# Patient Record
Sex: FEMALE | Race: BLACK OR AFRICAN AMERICAN | Employment: FULL TIME | URBAN - METROPOLITAN AREA
[De-identification: names, ages, dates, MRNs, and addresses within clinical notes are randomized per-mention and may not be internally consistent; named-entity substitution may affect disease eponyms.]

---

## 2017-01-23 ENCOUNTER — OFFICE VISIT (OUTPATIENT)
Dept: FAMILY MEDICINE CLINIC | Age: 32
End: 2017-01-23

## 2017-01-23 VITALS
BODY MASS INDEX: 15.89 KG/M2 | DIASTOLIC BLOOD PRESSURE: 68 MMHG | WEIGHT: 111 LBS | OXYGEN SATURATION: 98 % | RESPIRATION RATE: 18 BRPM | TEMPERATURE: 98 F | HEART RATE: 86 BPM | HEIGHT: 70 IN | SYSTOLIC BLOOD PRESSURE: 118 MMHG

## 2017-01-23 DIAGNOSIS — R53.83 OTHER FATIGUE: ICD-10-CM

## 2017-01-23 DIAGNOSIS — E03.9 ACQUIRED HYPOTHYROIDISM: Primary | ICD-10-CM

## 2017-01-23 DIAGNOSIS — N39.0 URINARY TRACT INFECTION, SITE UNSPECIFIED: ICD-10-CM

## 2017-01-23 DIAGNOSIS — R10.9 ABDOMINAL CRAMPING: ICD-10-CM

## 2017-01-23 LAB
BILIRUB UR QL STRIP: NEGATIVE
GLUCOSE UR-MCNC: NEGATIVE MG/DL
HCG URINE, QL. (POC): NEGATIVE
KETONES P FAST UR STRIP-MCNC: NEGATIVE MG/DL
PH UR STRIP: 8.5 [PH] (ref 4.6–8)
PROT UR QL STRIP: NEGATIVE MG/DL
SP GR UR STRIP: 1.01 (ref 1–1.03)
UA UROBILINOGEN AMB POC: ABNORMAL (ref 0.2–1)
URINALYSIS CLARITY POC: CLEAR
URINALYSIS COLOR POC: YELLOW
URINE BLOOD POC: NEGATIVE
URINE LEUKOCYTES POC: ABNORMAL
URINE NITRITES POC: NEGATIVE
VALID INTERNAL CONTROL?: YES

## 2017-01-23 RX ORDER — LEVOTHYROXINE SODIUM 137 UG/1
137 TABLET ORAL
Qty: 30 TAB | Refills: 1 | Status: SHIPPED | OUTPATIENT
Start: 2017-01-23 | End: 2017-03-03 | Stop reason: SDUPTHER

## 2017-01-23 RX ORDER — CIPROFLOXACIN 250 MG/1
250 TABLET, FILM COATED ORAL EVERY 12 HOURS
Qty: 10 TAB | Refills: 0 | Status: SHIPPED | OUTPATIENT
Start: 2017-01-23 | End: 2017-01-28

## 2017-01-23 NOTE — PROGRESS NOTES
HISTORY OF PRESENT ILLNESS  Reyes Staff is a 28 y.o. female. HPI: Patient is here to check her TSH, currently she is not taking any synthroid, she ran out of her pills. She C/O fatigue since she ran out of her medication. She reports she had milder and shorter menstrual period this month. She also C/O abdominal cramping and urinary frequency. She is sexually active and denies abnormal vaginal discharge. Past Medical History   Diagnosis Date    Arthritis     Scoliosis     Sickle cell trait ([address]     Thyroid disease      Past Surgical History   Procedure Laterality Date    Hx gyn      Hx hernia repair  5/85/10     Open umbilical hernia repair     Allergies   Allergen Reactions    Egg Nausea and Vomiting    Flexeril [Cyclobenzaprine] Nausea and Vomiting     Current Outpatient Prescriptions:     levothyroxine (SYNTHROID) 137 mcg tablet, Take 137 mcg by mouth Daily (before breakfast). , Disp: 30 Tab, Rfl: 1    ciprofloxacin HCl (CIPRO) 250 mg tablet, Take 1 Tab by mouth every twelve (12) hours for 5 days. , Disp: 10 Tab, Rfl: 0    ondansetron (ZOFRAN ODT) 4 mg disintegrating tablet, Take 1 Tab by mouth every eight (8) hours as needed for Nausea., Disp: 20 Tab, Rfl: 1    PRENATAL VIT/IRON FUMARATE/FA (PRENATAL PO), Take  by mouth., Disp: , Rfl:     ASPIRIN/ACETAMINOPHEN/CAFFEINE (MIGRAINE PO), Take  by mouth., Disp: , Rfl:     ERGOCALCIFEROL, VITAMIN D2, (VITAMIN D2 PO), Take  by mouth., Disp: , Rfl:     OXYCODONE HCL/ACETAMINOPHEN (PERCOCET PO), Take  by mouth., Disp: , Rfl:   Review of Systems   Constitutional: Positive for malaise/fatigue. HENT: Negative. Respiratory: Negative. Cardiovascular: Negative. Gastrointestinal: Negative. Genitourinary: Positive for frequency and urgency. Blood pressure 118/68, pulse 86, temperature 98 °F (36.7 °C), temperature source Oral, resp.  rate 18, height 5' 10\" (1.778 m), weight 111 lb (50.3 kg), last menstrual period 01/13/2017, SpO2 98 %.    Physical Exam   Constitutional: No distress. HENT:   Mouth/Throat: Oropharynx is clear and moist.   Neck: Neck supple. Cardiovascular: Normal rate and regular rhythm. No murmur heard. Pulmonary/Chest: Effort normal and breath sounds normal.   Abdominal: Soft. Bowel sounds are normal.   Genitourinary: No vaginal discharge found. Genitourinary Comments: UA is positive for infection   Nursing note and vitals reviewed. ASSESSMENT and PLAN    ICD-10-CM ICD-9-CM    1. Acquired hypothyroidism E03.9 244.9 TSH 3RD GENERATION      T4, FREE   2. Abdominal cramping R10.9 789.00 AMB POC URINALYSIS DIP STICK AUTO W/ MICRO      AMB POC URINE PREGNANCY TEST, VISUAL COLOR COMPARISON   3. Other fatigue R53.83 780.79 CBC WITH AUTOMATED DIFF      levothyroxine (SYNTHROID) 137 mcg tablet   4.  Urinary tract infection, site unspecified N39.0  ciprofloxacin HCl (CIPRO) 250 mg tablet   await labs,   Advised to follow up in 6 weeks to recheck her TSH  Pt was given an after visit summary which includes diagnosis, current medicines and vital and voiced understanding of treatment plan

## 2017-01-23 NOTE — PROGRESS NOTES
1. Have you been to the ER, urgent care clinic since your last visit? Hospitalized since your last visit? No    2. Have you seen or consulted any other health care providers outside of the [address] [place] [address] since your last visit? Include any pap smears or colon screening.  No     Chief Complaint   Patient presents with    Thyroid Problem     pt here for thyroid check    Urinary Frequency     pt c/o increased urinary frequency and cramping (abdominal)    Fatigue     pt c/o increased fatigue     Learning Assessment 1/13/2015   PRIMARY LEARNER Patient   PRIMARY LANGUAGE ENGLISH   LEARNER PREFERENCE PRIMARY LISTENING   ANSWERED BY self   RELATIONSHIP SELF   '

## 2017-01-23 NOTE — MR AVS SNAPSHOT
Visit Information Date & Time Provider Department Dept. Phone Encounter #  
 1/23/2017  3:00 PM Sheldon Akers, [address] [phone] [id number] Upcoming Health Maintenance Date Due DTaP/Tdap/Td series (1 - Tdap) 1/5/2006 PAP AKA CERVICAL CYTOLOGY 9/10/2015 Allergies as of 1/23/2017  Review Complete On: 1/23/2017 By: Sheldon Akers NP Severity Noted Reaction Type Reactions Egg  12/08/2015    Nausea and Vomiting Flexeril [Cyclobenzaprine]  01/13/2015    Nausea and Vomiting Current Immunizations  Never Reviewed No immunizations on file. Not reviewed this visit You Were Diagnosed With   
  
 Codes Comments Abdominal cramping    -  Primary ICD-10-CM: R10.9 ICD-9-CM: 789.00 Acquired hypothyroidism     ICD-10-CM: E03.9 ICD-9-CM: 244.9 Other fatigue     ICD-10-CM: R53.83 ICD-9-CM: 780.79 Urinary tract infection, site unspecified     ICD-10-CM: N39.0 Vitals BP Pulse Temp Resp Height(growth percentile) Weight(growth percentile)  
 118/68 (BP 1 Location: Left arm, BP Patient Position: Sitting) 86 98 °F (36.7 °C) (Oral) 18 5' 10\" (1.778 m) 111 lb (50.3 kg) LMP SpO2 BMI OB Status Smoking Status 01/13/2017 (Exact Date) 98% 15.93 kg/m2 Having regular periods Never Smoker Vitals History BMI and BSA Data Body Mass Index Body Surface Area 15.93 kg/m 2 1.58 m 2 Preferred Pharmacy Pharmacy Name Phone [facility] [address] [place] [address] Your Updated Medication List  
  
   
This list is accurate as of: 1/23/17  3:05 PM.  Always use your most recent med list.  
  
  
  
  
 ciprofloxacin HCl 250 mg tablet Commonly known as:  CIPRO Take 1 Tab by mouth every twelve (12) hours for 5 days. levothyroxine 137 mcg tablet Commonly known as:  SYNTHROID Take 137 mcg by mouth Daily (before breakfast).   
  
 MIGRAINE PO  
 Take  by mouth. ondansetron 4 mg disintegrating tablet Commonly known as:  ZOFRAN ODT Take 1 Tab by mouth every eight (8) hours as needed for Nausea. PERCOCET PO Take  by mouth. PRENATAL PO Take  by mouth. VITAMIN D2 PO Take  by mouth. Prescriptions Sent to Pharmacy Refills  
 levothyroxine (SYNTHROID) 137 mcg tablet 1 Sig: Take 137 mcg by mouth Daily (before breakfast). Class: Normal  
 Pharmacy: [address] [place] [address],[address] [place] [address], [address] #: [phone] Route: Oral  
 ciprofloxacin HCl (CIPRO) 250 mg tablet 0 Sig: Take 1 Tab by mouth every twelve (12) hours for 5 days. Class: Normal  
 Pharmacy: [address] [place] [address], [address] #: [phone] Route: Oral  
  
We Performed the Following AMB POC URINALYSIS DIP STICK AUTO W/ MICRO [25207 CPT(R)] AMB POC URINE PREGNANCY TEST, VISUAL COLOR COMPARISON [85472 CPT(R)] CBC WITH AUTOMATED DIFF [65123 CPT(R)] T4, FREE [id number] CPT(R)] TSH 3RD GENERATION [00176 CPT(R)] Introducing [facility] & HEALTH SERVICES! [facility] introduces Roc2Loc patient portal. Now you can access parts of your medical record, email your doctor's office, and request medication refills online. 1. In your internet browser, go to [email or web address] 2. Click on the First Time User? Click Here link in the Sign In box. You will see the New Member Sign Up page. 3. Enter your Roc2Loc Access Code exactly as it appears below. You will not need to use this code after youve completed the sign-up process. If you do not sign up before the expiration date, you must request a new code. · Roc2Loc Access Code: [email or web address] Expires: 1/24/2017  9:02 AM 
 
4. Enter the last four digits of your Social Security Number (xxxx) and Date of Birth (mm/dd/yyyy) as indicated and click Submit. You will be taken to the next sign-up page. 5. Create a LaunchHear ID. This will be your LaunchHear login ID and cannot be changed, so think of one that is secure and easy to remember. 6. Create a LaunchHear password. You can change your password at any time. 7. Enter your Password Reset Question and Answer. This can be used at a later time if you forget your password. 8. Enter your e-mail address. You will receive e-mail notification when new information is available in [address] 9. Click Sign Up. You can now view and download portions of your medical record. 10. Click the Download Summary menu link to download a portable copy of your medical information. If you have questions, please visit the Frequently Asked Questions section of the LaunchHear website. Remember, LaunchHear is NOT to be used for urgent needs. For medical emergencies, dial 911. Now available from your iPhone and Android! Please provide this summary of care documentation to your next provider. Your primary care clinician is listed as Deepthi COOK. If you have any questions after today's visit, please call [phone].

## 2017-01-24 ENCOUNTER — TELEPHONE (OUTPATIENT)
Dept: FAMILY MEDICINE CLINIC | Age: 32
End: 2017-01-24

## 2017-01-24 PROBLEM — D64.9 ANEMIA: Status: ACTIVE | Noted: 2017-01-24

## 2017-01-24 LAB
BASOPHILS # BLD AUTO: 0.1 X10E3/UL (ref 0–0.2)
BASOPHILS NFR BLD AUTO: 1 %
EOSINOPHIL # BLD AUTO: 0.3 X10E3/UL (ref 0–0.4)
EOSINOPHIL NFR BLD AUTO: 5 %
ERYTHROCYTE [DISTWIDTH] IN BLOOD BY AUTOMATED COUNT: 17.7 % (ref 12.3–15.4)
HCT VFR BLD AUTO: 25.8 % (ref 34–46.6)
HGB BLD-MCNC: 7.6 G/DL (ref 11.1–15.9)
IMM GRANULOCYTES # BLD: 0 X10E3/UL (ref 0–0.1)
IMM GRANULOCYTES NFR BLD: 0 %
LYMPHOCYTES # BLD AUTO: 1.7 X10E3/UL (ref 0.7–3.1)
LYMPHOCYTES NFR BLD AUTO: 26 %
MCH RBC QN AUTO: 19.2 PG (ref 26.6–33)
MCHC RBC AUTO-ENTMCNC: 29.5 G/DL (ref 31.5–35.7)
MCV RBC AUTO: 65 FL (ref 79–97)
MONOCYTES # BLD AUTO: 0.8 X10E3/UL (ref 0.1–0.9)
MONOCYTES NFR BLD AUTO: 11 %
NEUTROPHILS # BLD AUTO: 3.9 X10E3/UL (ref 1.4–7)
NEUTROPHILS NFR BLD AUTO: 57 %
PLATELET # BLD AUTO: 550 X10E3/UL (ref 150–379)
RBC # BLD AUTO: 3.95 X10E6/UL (ref 3.77–5.28)
T4 FREE SERPL-MCNC: 0.48 NG/DL (ref 0.82–1.77)
TSH SERPL DL<=0.005 MIU/L-ACNC: 29.53 UIU/ML (ref 0.45–4.5)
WBC # BLD AUTO: 6.8 X10E3/UL (ref 3.4–10.8)

## 2017-01-24 RX ORDER — LANOLIN ALCOHOL/MO/W.PET/CERES
CREAM (GRAM) TOPICAL
Qty: 30 TAB | Refills: 3 | Status: SHIPPED | OUTPATIENT
Start: 2017-01-24

## 2017-03-03 ENCOUNTER — OFFICE VISIT (OUTPATIENT)
Dept: FAMILY MEDICINE CLINIC | Age: 32
End: 2017-03-03

## 2017-03-03 VITALS
HEART RATE: 89 BPM | OXYGEN SATURATION: 100 % | DIASTOLIC BLOOD PRESSURE: 71 MMHG | TEMPERATURE: 98.8 F | WEIGHT: 113.2 LBS | BODY MASS INDEX: 16.21 KG/M2 | SYSTOLIC BLOOD PRESSURE: 109 MMHG | RESPIRATION RATE: 18 BRPM | HEIGHT: 70 IN

## 2017-03-03 DIAGNOSIS — E03.9 ACQUIRED HYPOTHYROIDISM: ICD-10-CM

## 2017-03-03 DIAGNOSIS — D57.3 SICKLE CELL TRAIT (HCC): Primary | ICD-10-CM

## 2017-03-03 DIAGNOSIS — R53.83 OTHER FATIGUE: ICD-10-CM

## 2017-03-03 RX ORDER — LEVOTHYROXINE SODIUM 137 UG/1
137 TABLET ORAL
Qty: 30 TAB | Refills: 1 | Status: SHIPPED | OUTPATIENT
Start: 2017-03-03 | End: 2017-03-06

## 2017-03-03 NOTE — PROGRESS NOTES
HISTORY OF PRESENT ILLNESS  Lesia Kaplan is a 28 y.o. female. HPI: This is six weeks follow up for patient, last time she ran out of her thyroid and thus her TSH was very high. She has been taking her synthroid 30 minutes before breakfast for six weeks. She reports being tired in spite of taking her synthroid. She was seeing endocrinologist but doesn't want to go back stating that\" I didn't like him. \"  She has history sickle cell trait and her hemoglobin is low, she has been taking slow iron. Today will check her cbc    Past Medical History:   Diagnosis Date    Arthritis     Scoliosis     Sickle [address] trait ([facility] [address]     Thyroid disease      Past Surgical History:   Procedure Laterality Date    HX GYN      HX HERNIA REPAIR  7/76/17    Open umbilical hernia repair     Allergies   Allergen Reactions    Egg Nausea and Vomiting    Flexeril [Cyclobenzaprine] Nausea and Vomiting     Current Outpatient Prescriptions:     levothyroxine (SYNTHROID) 137 mcg tablet, Take 137 mcg by mouth Daily (before breakfast). , Disp: 30 Tab, Rfl: 1    ferrous sulfate 325 mg (65 mg iron) tablet, Take one tab po daily, Disp: 30 Tab, Rfl: 3    PRENATAL VIT/IRON FUMARATE/FA (PRENATAL PO), Take  by mouth., Disp: , Rfl:     ERGOCALCIFEROL, VITAMIN D2, (VITAMIN D2 PO), Take  by mouth., Disp: , Rfl:     ondansetron (ZOFRAN ODT) 4 mg disintegrating tablet, Take 1 Tab by mouth every eight (8) hours as needed for Nausea., Disp: 20 Tab, Rfl: 1    OXYCODONE HCL/ACETAMINOPHEN (PERCOCET PO), Take  by mouth., Disp: , Rfl:     ASPIRIN/ACETAMINOPHEN/CAFFEINE (MIGRAINE PO), Take  by mouth., Disp: , Rfl:   Review of Systems   Constitutional: Positive for malaise/fatigue. Respiratory: Negative. Cardiovascular: Negative. Gastrointestinal: Negative. Blood pressure 109/71, pulse 89, temperature 98.8 °F (37.1 °C), temperature source Oral, resp.  rate 18, height 5' 10\" (1.778 m), weight 113 lb 3.2 oz (51.3 kg), last menstrual period 02/08/2017, SpO2 100 %. Physical Exam   Constitutional: No distress. HENT:   Mouth/Throat: Oropharynx is clear and moist.   Neck: Neck supple. Cardiovascular: Normal rate and regular rhythm. No murmur heard. Pulmonary/Chest: Effort normal and breath sounds normal.   Abdominal: Soft. Bowel sounds are normal.   Nursing note and vitals reviewed. ASSESSMENT and PLAN    ICD-10-CM ICD-9-CM    1. Sickle cell trait (HCC) D57.3 282.5 CBC WITH AUTOMATED DIFF   2. Other fatigue H35.03 218.49 METABOLIC PANEL, COMPREHENSIVE      levothyroxine (SYNTHROID) 137 mcg tablet   3.  Acquired hypothyroidism E03.9 244.9 TSH 3RD GENERATION      T4, FREE   await labs  Pt was given an after visit summary which includes diagnosis, current medicines and vital and voiced understanding of treatment plan

## 2017-03-03 NOTE — PROGRESS NOTES
\"Reviewed record in preparation for visit and have obtained the necessary documentation\"  Chief Complaint   Patient presents with    Hypothyroidism     follow up          Patient presents in the office today for a follow up of hypothyroidism     Patient also has complaints of increased fatigue     1. Have you been to the ER, urgent care clinic since your last visit? Hospitalized since your last visit? No    2. Have you seen or consulted any other health care providers outside of the [facility] [place] since your last visit? Include any pap smears or colon screening.  No

## 2017-03-04 LAB
ALBUMIN SERPL-MCNC: 4.6 G/DL (ref 3.5–5.5)
ALBUMIN/GLOB SERPL: 1.5 {RATIO} (ref 1.1–2.5)
ALP SERPL-CCNC: 47 IU/L (ref 39–117)
ALT SERPL-CCNC: 11 IU/L (ref 0–32)
AST SERPL-CCNC: 20 IU/L (ref 0–40)
BASOPHILS # BLD AUTO: 0.1 X10E3/UL (ref 0–0.2)
BASOPHILS NFR BLD AUTO: 1 %
BILIRUB SERPL-MCNC: 0.5 MG/DL (ref 0–1.2)
BUN SERPL-MCNC: 9 MG/DL (ref 6–20)
BUN/CREAT SERPL: 12 (ref 8–20)
CALCIUM SERPL-MCNC: 10 MG/DL (ref 8.7–10.2)
CHLORIDE SERPL-SCNC: 104 MMOL/L (ref 96–106)
CO2 SERPL-SCNC: 19 MMOL/L (ref 18–29)
CREAT SERPL-MCNC: 0.78 MG/DL (ref 0.57–1)
EOSINOPHIL # BLD AUTO: 0.3 X10E3/UL (ref 0–0.4)
EOSINOPHIL NFR BLD AUTO: 5 %
ERYTHROCYTE [DISTWIDTH] IN BLOOD BY AUTOMATED COUNT: 20.3 % (ref 12.3–15.4)
GLOBULIN SER CALC-MCNC: 3 G/DL (ref 1.5–4.5)
GLUCOSE SERPL-MCNC: 64 MG/DL (ref 65–99)
HCT VFR BLD AUTO: 29.8 % (ref 34–46.6)
HGB BLD-MCNC: 9 G/DL (ref 11.1–15.9)
IMM GRANULOCYTES # BLD: 0 X10E3/UL (ref 0–0.1)
IMM GRANULOCYTES NFR BLD: 0 %
LYMPHOCYTES # BLD AUTO: 1.4 X10E3/UL (ref 0.7–3.1)
LYMPHOCYTES NFR BLD AUTO: 20 %
MCH RBC QN AUTO: 20.5 PG (ref 26.6–33)
MCHC RBC AUTO-ENTMCNC: 30.2 G/DL (ref 31.5–35.7)
MCV RBC AUTO: 68 FL (ref 79–97)
MONOCYTES # BLD AUTO: 0.9 X10E3/UL (ref 0.1–0.9)
MONOCYTES NFR BLD AUTO: 13 %
NEUTROPHILS # BLD AUTO: 4.1 X10E3/UL (ref 1.4–7)
NEUTROPHILS NFR BLD AUTO: 61 %
PLATELET # BLD AUTO: 391 X10E3/UL (ref 150–379)
POTASSIUM SERPL-SCNC: 4 MMOL/L (ref 3.5–5.2)
PROT SERPL-MCNC: 7.6 G/DL (ref 6–8.5)
RBC # BLD AUTO: 4.38 X10E6/UL (ref 3.77–5.28)
SODIUM SERPL-SCNC: 139 MMOL/L (ref 134–144)
T4 FREE SERPL-MCNC: 1.79 NG/DL (ref 0.82–1.77)
TSH SERPL DL<=0.005 MIU/L-ACNC: 0.2 UIU/ML (ref 0.45–4.5)
WBC # BLD AUTO: 6.7 X10E3/UL (ref 3.4–10.8)

## 2017-03-06 RX ORDER — LEVOTHYROXINE SODIUM 100 UG/1
100 TABLET ORAL
Qty: 30 TAB | Refills: 2 | Status: SHIPPED | OUTPATIENT
Start: 2017-03-06 | End: 2017-05-06

## 2017-04-14 ENCOUNTER — OFFICE VISIT (OUTPATIENT)
Dept: FAMILY MEDICINE CLINIC | Age: 32
End: 2017-04-14

## 2017-04-14 VITALS
HEIGHT: 70 IN | WEIGHT: 111.8 LBS | DIASTOLIC BLOOD PRESSURE: 68 MMHG | BODY MASS INDEX: 16 KG/M2 | TEMPERATURE: 98 F | HEART RATE: 78 BPM | SYSTOLIC BLOOD PRESSURE: 104 MMHG | OXYGEN SATURATION: 99 % | RESPIRATION RATE: 18 BRPM

## 2017-04-14 DIAGNOSIS — E03.9 ACQUIRED HYPOTHYROIDISM: Primary | ICD-10-CM

## 2017-04-14 DIAGNOSIS — D64.9 ANEMIA, UNSPECIFIED TYPE: ICD-10-CM

## 2017-04-14 DIAGNOSIS — N64.4 BREAST PAIN: ICD-10-CM

## 2017-04-14 DIAGNOSIS — Z80.3 FAMILY HISTORY OF BREAST CANCER: ICD-10-CM

## 2017-04-14 DIAGNOSIS — N30.90 BLADDER INFECTION: ICD-10-CM

## 2017-04-14 LAB
BILIRUB UR QL STRIP: NEGATIVE
GLUCOSE UR-MCNC: NEGATIVE MG/DL
KETONES P FAST UR STRIP-MCNC: NEGATIVE MG/DL
PH UR STRIP: 7 [PH] (ref 4.6–8)
PROT UR QL STRIP: NEGATIVE MG/DL
SP GR UR STRIP: 1.01 (ref 1–1.03)
UA UROBILINOGEN AMB POC: NORMAL (ref 0.2–1)
URINALYSIS CLARITY POC: CLEAR
URINALYSIS COLOR POC: YELLOW
URINE BLOOD POC: NEGATIVE
URINE LEUKOCYTES POC: NEGATIVE
URINE NITRITES POC: NEGATIVE

## 2017-04-14 NOTE — MR AVS SNAPSHOT
Visit Information Date & Time Provider Department Dept. Phone Encounter #  
 4/14/2017  9:30 AM Niels Potter NP [facility] [phone] [id number] Upcoming Health Maintenance Date Due DTaP/Tdap/Td series (1 - Tdap) 1/5/2006 PAP AKA CERVICAL CYTOLOGY 9/10/2015 Allergies as of 4/14/2017  Review Complete On: 4/14/2017 By: Niels Potter NP Severity Noted Reaction Type Reactions Egg  12/08/2015    Nausea and Vomiting Flexeril [Cyclobenzaprine]  01/13/2015    Nausea and Vomiting Current Immunizations  Never Reviewed No immunizations on file. Not reviewed this visit You Were Diagnosed With   
  
 Codes Comments Bladder infection    -  Primary ICD-10-CM: N30.90 ICD-9-CM: 595.9 Acquired hypothyroidism     ICD-10-CM: E03.9 ICD-9-CM: 244.9 Anemia, unspecified type     ICD-10-CM: D64.9 ICD-9-CM: 285.9 Breast pain     ICD-10-CM: N64.4 ICD-9-CM: 611.71 Family history of breast cancer     ICD-10-CM: Z80.3 ICD-9-CM: V16.3 Vitals BP Pulse Temp Resp Height(growth percentile) Weight(growth percentile) 104/68 (BP 1 Location: Left arm, BP Patient Position: Sitting) 78 98 °F (36.7 °C) (Oral) 18 5' 10\" (1.778 m) 111 lb 12.8 oz (50.7 kg) LMP SpO2 BMI OB Status Smoking Status 03/31/2017 (Exact Date) 99% 16.04 kg/m2 Having regular periods Never Smoker BMI and BSA Data Body Mass Index Body Surface Area 16.04 kg/m 2 1.58 m 2 Preferred Pharmacy Pharmacy Name Phone [facility] [address] [place] [address] 78 Your Updated Medication List  
  
   
This list is accurate as of: 4/14/17  9:42 AM.  Always use your most recent med list.  
  
  
  
  
 ferrous sulfate 325 mg (65 mg iron) tablet Take one tab po daily  
  
 levothyroxine 100 mcg tablet Commonly known as:  SYNTHROID Take 1 Tab by mouth Daily (before breakfast).   
  
 MIGRAINE PO  
 Take  by mouth. ondansetron 4 mg disintegrating tablet Commonly known as:  ZOFRAN ODT Take 1 Tab by mouth every eight (8) hours as needed for Nausea. PERCOCET PO Take  by mouth. PRENATAL PO Take  by mouth. VITAMIN D2 PO Take  by mouth. We Performed the Following AMB POC URINALYSIS DIP STICK AUTO W/ MICRO [69312 CPT(R)] CBC WITH AUTOMATED DIFF [90488 CPT(R)] IRON PROFILE [id number] CPT(R)] T4, FREE [id number] CPT(R)] TSH 3RD GENERATION [67815 CPT(R)] To-Do List   
 04/14/2017 Imaging:  ALANNA MAMMOGRAM DIAG BILAT SAME DAY INCL CAD   
  
 04/14/2017 Imaging:  US BREASTS COMPLETE Introducing [facility] & HEALTH SERVICES! [facility] introduces Krux patient portal. Now you can access parts of your medical record, email your doctor's office, and request medication refills online. 1. In your internet browser, go to [email or web address] 2. Click on the First Time User? Click Here link in the Sign In box. You will see the New Member Sign Up page. 3. Enter your Krux Access Code exactly as it appears below. You will not need to use this code after youve completed the sign-up process. If you do not sign up before the expiration date, you must request a new code. · Krux Access Code: [email or web address] Expires: 7/13/2017  9:42 AM 
 
4. Enter the last four digits of your Social Security Number (xxxx) and Date of Birth (mm/dd/yyyy) as indicated and click Submit. You will be taken to the next sign-up page. 5. Create a "[employer]"t ID. This will be your Krux login ID and cannot be changed, so think of one that is secure and easy to remember. 6. Create a Krux password. You can change your password at any time. 7. Enter your Password Reset Question and Answer. This can be used at a later time if you forget your password. 8. Enter your e-mail address.  You will receive e-mail notification when new information is available in Live Life 360. 9. Click Sign Up. You can now view and download portions of your medical record. 10. Click the Download Summary menu link to download a portable copy of your medical information. If you have questions, please visit the Frequently Asked Questions section of the Live Life 360 website. Remember, Live Life 360 is NOT to be used for urgent needs. For medical emergencies, dial 911. Now available from your iPhone and Android! Please provide this summary of care documentation to your next provider. Your primary care clinician is listed as Kayla COOK. If you have any questions after today's visit, please call [phone].

## 2017-04-14 NOTE — PROGRESS NOTES
HISTORY OF PRESENT ILLNESS  Alysa Nixon is a 28 y.o. female. HPI: Following up to check her TSH, has been taking thyroid medication as prescribed. She also reports having breast pain for 1-2 weeks, denies breast lump, abnormal nipple discharge. Has family history of breast cancer. Has been having regular periods. Patient has sickle cell anemia and is taking only iron pills every other day. Will check her cbc and iron panel today. She reports her appetite is good and that she also drinks ensure daily. She also reports urinary frequency, but she has been drinking plenty of water and cranberry juice. Past Medical History:   Diagnosis Date    Arthritis     Scoliosis     Sickle cell trait ([address]     Thyroid disease         Past Surgical History:   Procedure Laterality Date    HX GYN      HX HERNIA REPAIR  3/99/48    Open umbilical hernia repair     Allergies   Allergen Reactions    Egg Nausea and Vomiting    Flexeril [Cyclobenzaprine] Nausea and Vomiting     Current Outpatient Prescriptions:     levothyroxine (SYNTHROID) 100 mcg tablet, Take 1 Tab by mouth Daily (before breakfast). , Disp: 30 Tab, Rfl: 2    ferrous sulfate 325 mg (65 mg iron) tablet, Take one tab po daily, Disp: 30 Tab, Rfl: 3    ondansetron (ZOFRAN ODT) 4 mg disintegrating tablet, Take 1 Tab by mouth every eight (8) hours as needed for Nausea., Disp: 20 Tab, Rfl: 1    OXYCODONE HCL/ACETAMINOPHEN (PERCOCET PO), Take  by mouth., Disp: , Rfl:     PRENATAL VIT/IRON FUMARATE/FA (PRENATAL PO), Take  by mouth., Disp: , Rfl:     ASPIRIN/ACETAMINOPHEN/CAFFEINE (MIGRAINE PO), Take  by mouth., Disp: , Rfl:     ERGOCALCIFEROL, VITAMIN D2, (VITAMIN D2 PO), Take  by mouth., Disp: , Rfl:   Review of Systems   Constitutional: Negative. Respiratory: Negative. Cardiovascular: Negative. Gastrointestinal: Negative. Genitourinary: Positive for frequency. Negative for dysuria, flank pain, hematuria and urgency.    Blood pressure 104/68, pulse 78, temperature 98 °F (36.7 °C), temperature source Oral, resp. rate 18, height 5' 10\" (1.778 m), weight 111 lb 12.8 oz (50.7 kg), last menstrual period 03/31/2017, SpO2 99 %. Physical Exam   Constitutional: No distress. HENT:   Mouth/Throat: Oropharynx is clear and moist.   Neck: Neck supple. Cardiovascular: Normal rate and regular rhythm. No murmur heard. Pulmonary/Chest: Effort normal and breath sounds normal.   Breast exam is normal   Abdominal: Soft. Bowel sounds are normal.   Genitourinary: No vaginal discharge found. Genitourinary Comments: UA is clear for infection and blood   Nursing note and vitals reviewed. ASSESSMENT and PLAN    ICD-10-CM ICD-9-CM    1. Acquired hypothyroidism E03.9 244.9 TSH 3RD GENERATION      T4, FREE   2. Bladder infection N30.90 595.9 AMB POC URINALYSIS DIP STICK AUTO W/ MICRO   3. Anemia, unspecified type D64.9 285.9 CBC WITH AUTOMATED DIFF      IRON PROFILE   4. Breast pain N64.4 611.71    5.  Family history of breast cancer Z80.3 V16.3 ALANNA MAMMOGRAM DIAG BILAT SAME DAY INCL CAD      US BREASTS COMPLETE   await labs  Pt was given an after visit summary which includes diagnosis, current medicines and vital and voiced understanding of treatment plan

## 2017-04-14 NOTE — PROGRESS NOTES
1. Have you been to the ER, urgent care clinic since your last visit? Hospitalized since your last visit?no      2. Have you seen or consulted any other health care providers outside of the [address] [place] [address] since your last visit? Include any pap smears or colon screening.  No      Chief Complaint   Patient presents with    Thyroid Problem     6 week followup    Sickle Cell Disease     6 week follow up     Learning Assessment 1/13/2015   PRIMARY LEARNER Patient   PRIMARY LANGUAGE ENGLISH   LEARNER PREFERENCE PRIMARY LISTENING   ANSWERED BY self   RELATIONSHIP SELF

## 2017-04-15 LAB
BASOPHILS # BLD AUTO: 0.1 X10E3/UL (ref 0–0.2)
BASOPHILS NFR BLD AUTO: 1 %
EOSINOPHIL # BLD AUTO: 0.4 X10E3/UL (ref 0–0.4)
EOSINOPHIL NFR BLD AUTO: 5 %
ERYTHROCYTE [DISTWIDTH] IN BLOOD BY AUTOMATED COUNT: 22.5 % (ref 12.3–15.4)
HCT VFR BLD AUTO: 36.6 % (ref 34–46.6)
HGB BLD-MCNC: 10.9 G/DL (ref 11.1–15.9)
IMM GRANULOCYTES # BLD: 0 X10E3/UL (ref 0–0.1)
IMM GRANULOCYTES NFR BLD: 0 %
IRON SATN MFR SERPL: 5 % (ref 15–55)
IRON SERPL-MCNC: 20 UG/DL (ref 27–159)
LYMPHOCYTES # BLD AUTO: 1.4 X10E3/UL (ref 0.7–3.1)
LYMPHOCYTES NFR BLD AUTO: 18 %
MCH RBC QN AUTO: 22.5 PG (ref 26.6–33)
MCHC RBC AUTO-ENTMCNC: 29.8 G/DL (ref 31.5–35.7)
MCV RBC AUTO: 76 FL (ref 79–97)
MONOCYTES # BLD AUTO: 1 X10E3/UL (ref 0.1–0.9)
MONOCYTES NFR BLD AUTO: 12 %
MORPHOLOGY BLD-IMP: ABNORMAL
NEUTROPHILS # BLD AUTO: 5.2 X10E3/UL (ref 1.4–7)
NEUTROPHILS NFR BLD AUTO: 64 %
PLATELET # BLD AUTO: 434 X10E3/UL (ref 150–379)
RBC # BLD AUTO: 4.85 X10E6/UL (ref 3.77–5.28)
T4 FREE SERPL-MCNC: 1.19 NG/DL (ref 0.82–1.77)
TIBC SERPL-MCNC: 423 UG/DL (ref 250–450)
TSH SERPL DL<=0.005 MIU/L-ACNC: 2.15 UIU/ML (ref 0.45–4.5)
UIBC SERPL-MCNC: 403 UG/DL (ref 131–425)
WBC # BLD AUTO: 8.1 X10E3/UL (ref 3.4–10.8)

## 2017-04-17 ENCOUNTER — TELEPHONE (OUTPATIENT)
Dept: FAMILY MEDICINE CLINIC | Age: 32
End: 2017-04-17

## 2017-04-17 NOTE — TELEPHONE ENCOUNTER
Attempted to call pt back with her labs but to no avail, left a voicemail message to call office back.

## 2017-04-17 NOTE — TELEPHONE ENCOUNTER
----- Message from Azul Campo sent at 4/17/2017 10:28 AM EDT -----  Regarding: AJ Moss/Telephone  Pt is returning a call to Katerin regarding labs, best contact number is her work number [phone] opt. [phone].

## 2017-05-05 ENCOUNTER — OFFICE VISIT (OUTPATIENT)
Dept: FAMILY MEDICINE CLINIC | Age: 32
End: 2017-05-05

## 2017-05-05 VITALS
SYSTOLIC BLOOD PRESSURE: 140 MMHG | HEART RATE: 88 BPM | RESPIRATION RATE: 18 BRPM | TEMPERATURE: 98 F | OXYGEN SATURATION: 98 % | DIASTOLIC BLOOD PRESSURE: 92 MMHG | BODY MASS INDEX: 15.95 KG/M2 | HEIGHT: 70 IN | WEIGHT: 111.4 LBS

## 2017-05-05 DIAGNOSIS — E03.9 ACQUIRED HYPOTHYROIDISM: Primary | ICD-10-CM

## 2017-05-05 NOTE — PROGRESS NOTES
1. Have you been to the ER, urgent care clinic since your last visit? Hospitalized since your last visit? Amie Kaufman on [address] for pregnancy test    2. Have you seen or consulted any other health care providers outside of the [facility] since your last visit? Include any pap smears or colon screening.  No     Chief Complaint   Patient presents with    Thyroid Problem     TSH follow up    Fatigue     patient is here for fattigue and nausea related to pregnacny and is concerned     Learning Assessment 1/13/2015   PRIMARY LEARNER Patient   PRIMARY LANGUAGE ENGLISH   LEARNER PREFERENCE PRIMARY LISTENING   ANSWERED BY self   RELATIONSHIP SELF

## 2017-05-05 NOTE — PROGRESS NOTES
HISTORY OF PRESENT ILLNESS  Dede Thorne is a 28 y.o. female. HPI: Patient states that she is eight weeks pregnant and that seeing her OB, she comes in to repeat her TSH and free per her OB. Taking daily vitamins and for nausea take Zofran. Her BP is slightly elevated today, she has appointment with her Ob next Friday. Past Medical History:   Diagnosis Date    Arthritis     Scoliosis     Sickle cell trait ([address]     Thyroid disease      Past Surgical History:   Procedure Laterality Date    HX GYN      HX HERNIA REPAIR  4/36/73    Open umbilical hernia repair     Allergies   Allergen Reactions    Egg Nausea and Vomiting    Flexeril [Cyclobenzaprine] Nausea and Vomiting     Current Outpatient Prescriptions:     levothyroxine (SYNTHROID) 100 mcg tablet, Take 1 Tab by mouth Daily (before breakfast). , Disp: 30 Tab, Rfl: 2    PRENATAL VIT/IRON FUMARATE/FA (PRENATAL PO), Take  by mouth., Disp: , Rfl:     ferrous sulfate 325 mg (65 mg iron) tablet, Take one tab po daily, Disp: 30 Tab, Rfl: 3    ondansetron (ZOFRAN ODT) 4 mg disintegrating tablet, Take 1 Tab by mouth every eight (8) hours as needed for Nausea., Disp: 20 Tab, Rfl: 1    ASPIRIN/ACETAMINOPHEN/CAFFEINE (MIGRAINE PO), Take  by mouth., Disp: , Rfl:     ERGOCALCIFEROL, VITAMIN D2, (VITAMIN D2 PO), Take  by mouth., Disp: , Rfl:   Review of Systems   Constitutional: Negative. Respiratory: Negative. Cardiovascular: Negative. Gastrointestinal: Negative. Blood pressure (!) 140/92, pulse 88, temperature 98 °F (36.7 °C), temperature source Oral, resp. rate 18, height 5' 10\" (1.778 m), weight 111 lb 6.4 oz (50.5 kg), last menstrual period 03/31/2017, SpO2 98 %. Physical Exam   Constitutional: No distress. HENT:   Mouth/Throat: Oropharynx is clear and moist.   Neck: Neck supple. Cardiovascular: Normal rate and regular rhythm. No murmur heard. Pulmonary/Chest: Effort normal and breath sounds normal.   Abdominal: Soft.  Bowel sounds are normal.   Nursing note and vitals reviewed. ASSESSMENT and PLAN    ICD-10-CM ICD-9-CM    1.  Acquired hypothyroidism E03.9 244.9 TSH 3RD GENERATION      T4, FREE   await labs  Pt was given an after visit summary which includes diagnosis, current medicines and vital and voiced understanding of treatment plan

## 2017-05-06 LAB
T4 FREE SERPL-MCNC: 1.21 NG/DL (ref 0.82–1.77)
TSH SERPL DL<=0.005 MIU/L-ACNC: 9.45 UIU/ML (ref 0.45–4.5)

## 2017-05-06 RX ORDER — LEVOTHYROXINE SODIUM 137 UG/1
137 TABLET ORAL
Qty: 30 TAB | Refills: 1 | Status: SHIPPED | OUTPATIENT
Start: 2017-05-06 | End: 2017-05-06

## 2017-05-06 RX ORDER — LEVOTHYROXINE SODIUM 112 UG/1
112 TABLET ORAL
Qty: 30 TAB | Refills: 1 | Status: SHIPPED | OUTPATIENT
Start: 2017-05-06 | End: 2017-06-30 | Stop reason: SDUPTHER

## 2017-05-17 DIAGNOSIS — E03.9 ACQUIRED HYPOTHYROIDISM: Primary | ICD-10-CM

## 2017-06-13 LAB
T4 FREE SERPL-MCNC: 1.36 NG/DL (ref 0.82–1.77)
TSH SERPL DL<=0.005 MIU/L-ACNC: 1.92 UIU/ML (ref 0.45–4.5)

## 2017-06-30 DIAGNOSIS — E03.9 ACQUIRED HYPOTHYROIDISM: Primary | ICD-10-CM

## 2017-06-30 RX ORDER — LEVOTHYROXINE SODIUM 112 UG/1
112 TABLET ORAL
Qty: 30 TAB | Refills: 1 | Status: SHIPPED | OUTPATIENT
Start: 2017-06-30 | End: 2017-10-25 | Stop reason: CLARIF

## 2017-06-30 NOTE — TELEPHONE ENCOUNTER
----- Message from Velvet Conception sent at 6/30/2017 12:11 PM EDT -----  Regarding: Genesis Moss/refill  Pt request refill on Levothyroxine at pharmacy. Best contact number [phone].

## 2017-06-30 NOTE — TELEPHONE ENCOUNTER
Contact # is [phone]    Patient states that she comes in every 3 weeks to have her thyroid levels checked and is requesting the orders be placed in so she can come in next week and have them checked. She is also requesting a refill on her medication:  Requested Prescriptions     Pending Prescriptions Disp Refills    levothyroxine (SYNTHROID) 112 mcg tablet 30 Tab 1     Sig: Take 1 Tab by mouth Daily (before breakfast).

## 2017-07-04 LAB
T4 FREE SERPL-MCNC: 1.2 NG/DL (ref 0.82–1.77)
TSH SERPL DL<=0.005 MIU/L-ACNC: 5.63 UIU/ML (ref 0.45–4.5)

## 2017-07-05 RX ORDER — LEVOTHYROXINE SODIUM 137 UG/1
137 TABLET ORAL
Qty: 30 TAB | Refills: 0 | Status: SHIPPED | OUTPATIENT
Start: 2017-07-05 | End: 2017-10-25 | Stop reason: SDUPTHER

## 2017-07-27 ENCOUNTER — OFFICE VISIT (OUTPATIENT)
Dept: INTERNAL MEDICINE CLINIC | Age: 32
End: 2017-07-27

## 2017-07-27 VITALS
BODY MASS INDEX: 16.03 KG/M2 | SYSTOLIC BLOOD PRESSURE: 114 MMHG | TEMPERATURE: 98 F | WEIGHT: 112 LBS | HEART RATE: 68 BPM | OXYGEN SATURATION: 96 % | HEIGHT: 70 IN | DIASTOLIC BLOOD PRESSURE: 74 MMHG | RESPIRATION RATE: 14 BRPM

## 2017-07-27 DIAGNOSIS — E03.2 HYPOTHYROIDISM DUE TO MEDICATION: Primary | ICD-10-CM

## 2017-07-27 DIAGNOSIS — D57.3 SICKLE CELL TRAIT (HCC): ICD-10-CM

## 2017-07-27 DIAGNOSIS — Z00.00 PHYSICAL EXAM: ICD-10-CM

## 2017-07-27 NOTE — PROGRESS NOTES
Chief Complaint   Patient presents with    Thyroid Problem     Thyroid Disease request TSH lab   . SUBECTIVE:    Alee Paulson is a 28 y.o. female Comes in as new patient. She wants to follow up for thyroid disease. At age 15 she was treated with Radioactive Iodine for presumed Graves' Disease. Several months after this she has been on thyroid supplement since. Doses have changed a bit since being pregnant. She is 17 weeks pregnant with an uneventful pregnancy thus far. She also has a history of a hypocoagulable state on her father's side with multiple relatives having an apparent defect. Current Outpatient Prescriptions   Medication Sig Dispense Refill    levothyroxine (SYNTHROID) 137 mcg tablet Take 137 mcg by mouth Daily (before breakfast). 30 Tab 0    PRENATAL VIT/IRON FUMARATE/FA (PRENATAL PO) Take  by mouth.  levothyroxine (SYNTHROID) 112 mcg tablet Take 1 Tab by mouth Daily (before breakfast). 30 Tab 1    ferrous sulfate 325 mg (65 mg iron) tablet Take one tab po daily 30 Tab 3    ondansetron (ZOFRAN ODT) 4 mg disintegrating tablet Take 1 Tab by mouth every eight (8) hours as needed for Nausea. 20 Tab 1    ASPIRIN/ACETAMINOPHEN/CAFFEINE (MIGRAINE PO) Take  by mouth.  ERGOCALCIFEROL, VITAMIN D2, (VITAMIN D2 PO) Take  by mouth.        Past Medical History:   Diagnosis Date    Arthritis     Scoliosis     Sickle [address] trait ([facility] [address]     Thyroid disease      Past Surgical History:   Procedure Laterality Date    HX GYN      HX HERNIA REPAIR  3/32/32    Open umbilical hernia repair     Allergies   Allergen Reactions    Egg Nausea and Vomiting    Flexeril [Cyclobenzaprine] Nausea and Vomiting       REVIEW OF SYSTEMS:  Review of Systems - Negative except   ENT ROS: negative for - headaches, hearing change, nasal congestion, oral lesions, tinnitus, visual changes or   Respiratory ROS: no cough, shortness of breath, or wheezing  Cardiovascular ROS: no chest pain or dyspnea on exertion  Gastrointestinal ROS: no abdominal pain, change in bowel habits, or black or blood  Genito-Urinary ROS: no dysuria, trouble voiding, or hematuria  Musculoskeletal ROS: negative  Neurological ROS: no TIA or stroke symptoms      Social History     Social History    Marital status: SINGLE     Spouse name: N/A    Number of children: N/A    Years of education: N/A     Social History Main Topics    Smoking status: Never Smoker    Smokeless tobacco: Never Used    Alcohol use No    Drug use: No    Sexual activity: Yes     Partners: Male     Other Topics Concern    None     Social History Narrative   r  Family History   Problem Relation Age of Onset    Arthritis-osteo Mother     Hypertension Mother     Kidney Disease Mother     Kidney Disease Father     Asthma Father     Diabetes Maternal Aunt     Asthma Paternal Aunt     Diabetes Paternal Aunt     Cancer Paternal Grandmother     Asthma Sister     Cancer Maternal Uncle      liver cancer    Heart Attack Maternal Uncle        OBJECTIVE:  Visit Vitals    /74 (BP 1 Location: Right arm, BP Patient Position: Sitting)    Pulse 68    Temp 98 °F (36.7 °C) (Oral)    Resp 14    Ht 5' 10\" (1.778 m)    Wt 112 lb (50.8 kg)    SpO2 96%    BMI 16.07 kg/m2     ENT: perrla,  eom intact  NECK: supple. Thyroid normal, no JVD  CHEST: clear to ascultation and percussion, T-spine scoliosis  HEART: regular rate and rhythm, 1/6 systolic ejection murmur  ABD: soft, bowel sounds active, 17 week gravid uterus  EXTREMITIES: no edema, pulse 1+  INTEGUMENT: clear      ASSESSMENT:  1. Hypothyroidism due to medication    2. Sickle cell trait ([address]    3. Physical exam        PLAN:    1. TSH will be checked and appropriate adjustments will be made if need be. A dose was most recently increased specifically three and a half weeks ago. Adjustment should be made every four weeks to allow for stabilization. 2. She does have history of sickle cell trait. She mentioned that her father has a hypocoagulable, as have multiple of his relatives. To date she has not had any DVT or pulmonary emboli. On return visit, probably would be the better part of valor to rule out a hypocoagulable state. .  Orders Placed This Encounter    LIPID PANEL    METABOLIC PANEL, COMPREHENSIVE    URINALYSIS W/ RFLX MICROSCOPIC    TSH 3RD GENERATION       Follow-up Disposition:  Return in about 4 weeks (around 8/24/2017).       Cristina Correa MD

## 2017-07-27 NOTE — PROGRESS NOTES
Chief Complaint   Patient presents with    Thyroid Problem     Thyroid Disease request TSH lab     Joaquin [place] is a 29 y/o female who presents with thyroid disease. Pt have been dealing with these issues for a few years and would like to have TSH blood work done today. No other questions for the provider at this time.     Vitals:    07/27/17 1302   BP: 114/74   BP 1 Location: Right arm   BP Patient Position: Sitting   Pulse: 68   Resp: 14   Temp: 98 °F (36.7 °C)   TempSrc: Oral   SpO2: 96%   Weight: 112 lb (50.8 kg)   Height: 5' 10\" (1.778 m)

## 2017-07-28 LAB
ALBUMIN SERPL-MCNC: 4.2 G/DL (ref 3.5–5.5)
ALBUMIN/GLOB SERPL: 1.4 {RATIO} (ref 1.2–2.2)
ALP SERPL-CCNC: 51 IU/L (ref 39–117)
ALT SERPL-CCNC: 12 IU/L (ref 0–32)
APPEARANCE UR: ABNORMAL
AST SERPL-CCNC: 23 IU/L (ref 0–40)
BILIRUB SERPL-MCNC: 0.6 MG/DL (ref 0–1.2)
BILIRUB UR QL STRIP: NEGATIVE
BUN SERPL-MCNC: 9 MG/DL (ref 6–20)
BUN/CREAT SERPL: 17 (ref 9–23)
CALCIUM SERPL-MCNC: 10.3 MG/DL (ref 8.7–10.2)
CHLORIDE SERPL-SCNC: 100 MMOL/L (ref 96–106)
CHOLEST SERPL-MCNC: 187 MG/DL (ref 100–199)
CO2 SERPL-SCNC: 19 MMOL/L (ref 18–29)
COLOR UR: YELLOW
CREAT SERPL-MCNC: 0.52 MG/DL (ref 0.57–1)
GLOBULIN SER CALC-MCNC: 3.1 G/DL (ref 1.5–4.5)
GLUCOSE SERPL-MCNC: 63 MG/DL (ref 65–99)
GLUCOSE UR QL: NEGATIVE
HDLC SERPL-MCNC: 79 MG/DL
HGB UR QL STRIP: NEGATIVE
KETONES UR QL STRIP: NEGATIVE
LDLC SERPL CALC-MCNC: 90 MG/DL (ref 0–99)
LEUKOCYTE ESTERASE UR QL STRIP: NEGATIVE
MICRO URNS: ABNORMAL
NITRITE UR QL STRIP: NEGATIVE
PH UR STRIP: 6.5 [PH] (ref 5–7.5)
POTASSIUM SERPL-SCNC: 3.8 MMOL/L (ref 3.5–5.2)
PROT SERPL-MCNC: 7.3 G/DL (ref 6–8.5)
PROT UR QL STRIP: NEGATIVE
SODIUM SERPL-SCNC: 137 MMOL/L (ref 134–144)
SP GR UR: 1.02 (ref 1–1.03)
SPECIMEN STATUS REPORT, ROLRST: NORMAL
TRIGL SERPL-MCNC: 88 MG/DL (ref 0–149)
TSH SERPL DL<=0.005 MIU/L-ACNC: 2.41 UIU/ML (ref 0.45–4.5)
UROBILINOGEN UR STRIP-MCNC: 0.2 MG/DL (ref 0.2–1)
VLDLC SERPL CALC-MCNC: 18 MG/DL (ref 5–40)

## 2017-08-29 ENCOUNTER — TELEPHONE (OUTPATIENT)
Dept: FAMILY MEDICINE CLINIC | Age: 32
End: 2017-08-29

## 2017-08-29 NOTE — TELEPHONE ENCOUNTER
----- Message from Renny Subramanian sent at 8/29/2017  2:25 PM EDT -----  Regarding: AJ Moss/ telephone  Pt is requesting a call back to discuss what to take for a common cold, pt is currently five months pregnant. Best contact number [phone].

## 2017-08-29 NOTE — TELEPHONE ENCOUNTER
Return call to patient, she has dry cough and a mild sore throat. Patient declined appointment. Advised patient to gargle with salt water twice a day to soothe throat. Patient is afebrile, advised to call back if symptoms become worse.

## 2017-08-31 ENCOUNTER — OFFICE VISIT (OUTPATIENT)
Dept: INTERNAL MEDICINE CLINIC | Age: 32
End: 2017-08-31

## 2017-08-31 VITALS
OXYGEN SATURATION: 99 % | BODY MASS INDEX: 17.31 KG/M2 | RESPIRATION RATE: 16 BRPM | TEMPERATURE: 98.2 F | SYSTOLIC BLOOD PRESSURE: 105 MMHG | DIASTOLIC BLOOD PRESSURE: 68 MMHG | HEIGHT: 70 IN | HEART RATE: 86 BPM | WEIGHT: 120.9 LBS

## 2017-08-31 DIAGNOSIS — Z34.90 INTRAUTERINE PREGNANCY: ICD-10-CM

## 2017-08-31 DIAGNOSIS — E03.2 HYPOTHYROIDISM DUE TO MEDICATION: ICD-10-CM

## 2017-08-31 DIAGNOSIS — R06.09 DYSPNEA ON EXERTION: Primary | ICD-10-CM

## 2017-08-31 NOTE — MR AVS SNAPSHOT
Visit Information Date & Time Provider Department Dept. Phone Encounter #  
 8/31/2017  9:00 AM Elisha Moritz, MD [facility] [phone] [id number] Follow-up Instructions Return in about 4 weeks (around 9/28/2017). Upcoming Health Maintenance Date Due DTaP/Tdap/Td series (1 - Tdap) 11/30/2017* INFLUENZA AGE 9 TO ADULT 11/30/2017* PAP AKA CERVICAL CYTOLOGY 5/12/2020 *Topic was postponed. The date shown is not the original due date. Allergies as of 8/31/2017  Review Complete On: 8/31/2017 By: Sandra Fabiola Severity Noted Reaction Type Reactions Egg  12/08/2015    Nausea and Vomiting Flexeril [Cyclobenzaprine]  01/13/2015    Nausea and Vomiting Current Immunizations  Never Reviewed No immunizations on file. Not reviewed this visit You Were Diagnosed With   
  
 Codes Comments Dyspnea on exertion    -  Primary ICD-10-CM: R06.09 
ICD-9-CM: 786.09 Intrauterine pregnancy     ICD-10-CM: Z33.1 ICD-9-CM: V22.2 Hypothyroidism due to medication     ICD-10-CM: E03.2 ICD-9-CM: 244.8, E980.5 Vitals BP Pulse Temp Resp Height(growth percentile) Weight(growth percentile) 105/68 (BP 1 Location: Right arm, BP Patient Position: Sitting) 86 98.2 °F (36.8 °C) (Oral) 16 5' 10\" (1.778 m) 120 lb 14.4 oz (54.8 kg) LMP SpO2 BMI OB Status Smoking Status 03/31/2017 (Exact Date) 99% 17.35 kg/m2 Pregnant Never Smoker Vitals History BMI and BSA Data Body Mass Index Body Surface Area  
 17.35 kg/m 2 1.65 m 2 Preferred Pharmacy Pharmacy Name Phone [address] [facility] Your Updated Medication List  
  
   
This list is accurate as of: 8/31/17 10:06 AM.  Always use your most recent med list.  
  
  
  
  
 ferrous sulfate 325 mg (65 mg iron) tablet Take one tab po daily * levothyroxine 112 mcg tablet Commonly known as:  SYNTHROID Take 1 Tab by mouth Daily (before breakfast). * levothyroxine 137 mcg tablet Commonly known as:  SYNTHROID Take 137 mcg by mouth Daily (before breakfast). MIGRAINE PO Take  by mouth. ondansetron 4 mg disintegrating tablet Commonly known as:  ZOFRAN ODT Take 1 Tab by mouth every eight (8) hours as needed for Nausea. PRENATAL PO Take  by mouth. VITAMIN D2 PO Take  by mouth. * Notice: This list has 2 medication(s) that are the same as other medications prescribed for you. Read the directions carefully, and ask your doctor or other care provider to review them with you. We Performed the Following BNP [id number] CPT(R)] TSH 3RD GENERATION [76873 CPT(R)] Follow-up Instructions Return in about 4 weeks (around 9/28/2017). To-Do List   
 09/07/2017 ECHO:  ECHO 2D ADULT Introducing \[facility]\"" & HEALTH SERVICES! Tamara Earl introduces [employer] patient portal. Now you can access parts of your medical record, email your doctor's office, and request medication refills online. 1. In your internet browser, go to [email or web address] 2. Click on the First Time User? Click Here link in the Sign In box. You will see the New Member Sign Up page. 3. Enter your [employer] Access Code exactly as it appears below. You will not need to use this code after youve completed the sign-up process. If you do not sign up before the expiration date, you must request a new code. · [employer] Access Code: [email or web address] Expires: 10/25/2017  2:13 PM 
 
4. Enter the last four digits of your Social Security Number (xxxx) and Date of Birth (mm/dd/yyyy) as indicated and click Submit. You will be taken to the next sign-up page. 5. Create a [employer] ID. This will be your [employer] login ID and cannot be changed, so think of one that is secure and easy to remember. 6. Create a [employer] password. You can change your password at any time. 7. Enter your Password Reset Question and Answer. This can be used at a later time if you forget your password. 8. Enter your e-mail address. You will receive e-mail notification when new information is available in [address] 9. Click Sign Up. You can now view and download portions of your medical record. 10. Click the Download Summary menu link to download a portable copy of your medical information. If you have questions, please visit the Frequently Asked Questions section of the [employer] website. Remember, [employer] is NOT to be used for urgent needs. For medical emergencies, dial 911. Now available from your iPhone and Android! Please provide this summary of care documentation to your next provider. Your primary care clinician is listed as Geovany COOK. If you have any questions after today's visit, please call [phone].

## 2017-08-31 NOTE — PROGRESS NOTES
Chief Complaint   Patient presents with    Hypothyroidism     1 month follow up      1. Have you been to the ER, urgent care clinic since your last visit? Hospitalized since your last visit? Yes Reason for visit: shortness of breath and tightness in chest due to blood clots about 3 weeks ago. 2. Have you seen or consulted any other health care providers outside of the [address] [place] [address] since your last visit? Include any pap smears or colon screening.  Yes Where: [facility]

## 2017-08-31 NOTE — PROGRESS NOTES
Chief Complaint   Patient presents with    Hypothyroidism     1 month follow up    . SUBECTIVE:    Eugenia Murray is a 28 y.o. female Comes in for return visit for follow up of her thyroid status. Her last TSH was excellent. She complains of shortness of breath starting several weeks ago. She was able to do all of her day-to-day activities, but it is an effort. She has a very small frame. The baby is getting bigger obviously. Current Outpatient Prescriptions   Medication Sig Dispense Refill    levothyroxine (SYNTHROID) 137 mcg tablet Take 137 mcg by mouth Daily (before breakfast). 30 Tab 0    PRENATAL VIT/IRON FUMARATE/FA (PRENATAL PO) Take  by mouth.  levothyroxine (SYNTHROID) 112 mcg tablet Take 1 Tab by mouth Daily (before breakfast). 30 Tab 1    ferrous sulfate 325 mg (65 mg iron) tablet Take one tab po daily 30 Tab 3    ondansetron (ZOFRAN ODT) 4 mg disintegrating tablet Take 1 Tab by mouth every eight (8) hours as needed for Nausea. 20 Tab 1    ASPIRIN/ACETAMINOPHEN/CAFFEINE (MIGRAINE PO) Take  by mouth.  ERGOCALCIFEROL, VITAMIN D2, (VITAMIN D2 PO) Take  by mouth.        Past Medical History:   Diagnosis Date    Arthritis     Scoliosis     Sick[facility] [address]     Thyroid disease      Past Surgical History:   Procedure Laterality Date    HX GYN      HX HERNIA REPAIR  0/43/20    Open umbilical hernia repair     Allergies   Allergen Reactions    Egg Nausea and Vomiting    Flexeril [Cyclobenzaprine] Nausea and Vomiting       REVIEW OF SYSTEMS:  Review of Systems - Negative except   ENT ROS: negative for - headaches, hearing change, nasal congestion, oral lesions, tinnitus, visual changes or   Respiratory ROS: no cough, shortness of breath, or wheezing  Cardiovascular ROS: no chest pain or dyspnea on exertion  Gastrointestinal ROS: no abdominal pain, change in bowel habits, or black or blood  Genito-Urinary ROS: no dysuria, trouble voiding, or hematuria  Musculoskeletal ROS: negative  Neurological ROS: no TIA or stroke symptoms      Social History     Social History    Marital status: SINGLE     Spouse name: N/A    Number of children: N/A    Years of education: N/A     Social History Main Topics    Smoking status: Never Smoker    Smokeless tobacco: Never Used    Alcohol use No    Drug use: No    Sexual activity: Yes     Partners: Male     Other Topics Concern    None     Social History Narrative   r  Family History   Problem Relation Age of Onset    Arthritis-osteo Mother     Hypertension Mother     Kidney Disease Mother     Kidney Disease Father     Asthma Father     Diabetes Maternal Aunt     Asthma Paternal Aunt     Diabetes Paternal Aunt     Cancer Paternal Grandmother     Asthma Sister     Cancer Maternal Uncle      liver cancer    Heart Attack Maternal Uncle        OBJECTIVE:  Visit Vitals    /68 (BP 1 Location: Right arm, BP Patient Position: Sitting)    Pulse 86    Temp 98.2 °F (36.8 °C) (Oral)    Resp 16    Ht 5' 10\" (1.778 m)    Wt 120 lb 14.4 oz (54.8 kg)    LMP 03/31/2017 (Exact Date)    SpO2 99%    BMI 17.35 kg/m2     ENT: perrla,  eom intact  NECK: supple. Thyroid normal, equivocal JVD  CHEST: clear to ascultation and percussion   HEART: regular rate and rhythm,1/6 CHRISSIE  ABD: soft, bowel sounds active,   EXTREMITIES: no edema, pulse 1+  INTEGUMENT: clear      ASSESSMENT:  1. Dyspnea on exertion    2. Intrauterine pregnancy    3. Hypothyroidism due to medication        PLAN:    1. The most likely etiology of her shortness of breath has been the diaphragmatic excursion related to the pregnancy. I, however, am concerned that there might a beginning of a cardiac problem. Echocardiogram will be obtained. I will also check a BMP. 2. TSH will be checked. 3. I encourage her to continue her physical activities as it is currently.           Follow-up Disposition:  Return in about 4 weeks (around 9/28/2017).       Robert Connelly MD

## 2017-09-01 LAB — BNP SERPL-MCNC: 32.3 PG/ML (ref 0–100)

## 2017-09-02 LAB — TSH SERPL DL<=0.005 MIU/L-ACNC: 2.05 UIU/ML (ref 0.45–4.5)

## 2017-09-08 ENCOUNTER — HOSPITAL ENCOUNTER (OUTPATIENT)
Dept: NON INVASIVE DIAGNOSTICS | Age: 32
Discharge: HOME | End: 2017-09-08
Attending: INTERNAL MEDICINE
Payer: COMMERCIAL

## 2017-09-08 DIAGNOSIS — R06.09 DYSPNEA ON EXERTION: ICD-10-CM

## 2017-09-08 PROCEDURE — 93306 TTE W/DOPPLER COMPLETE: CPT

## 2017-10-13 ENCOUNTER — OFFICE VISIT (OUTPATIENT)
Dept: INTERNAL MEDICINE CLINIC | Age: 32
End: 2017-10-13

## 2017-10-13 VITALS
TEMPERATURE: 98.2 F | SYSTOLIC BLOOD PRESSURE: 106 MMHG | WEIGHT: 124.3 LBS | DIASTOLIC BLOOD PRESSURE: 88 MMHG | HEART RATE: 86 BPM | OXYGEN SATURATION: 99 % | BODY MASS INDEX: 17.79 KG/M2 | RESPIRATION RATE: 16 BRPM | HEIGHT: 70 IN

## 2017-10-13 DIAGNOSIS — R06.00 DYSPNEA, UNSPECIFIED TYPE: ICD-10-CM

## 2017-10-13 DIAGNOSIS — E03.2 HYPOTHYROIDISM DUE TO MEDICATION: Primary | ICD-10-CM

## 2017-10-13 NOTE — MR AVS SNAPSHOT
Visit Information Date & Time Provider Department Dept. Phone Encounter #  
 10/13/2017  1:00 PM Sharif Acevedo MD [facility] [phone] [id number] Upcoming Health Maintenance Date Due DTaP/Tdap/Td series (1 - Tdap) 11/30/2017* INFLUENZA AGE 9 TO ADULT 11/30/2017* PAP AKA CERVICAL CYTOLOGY 5/12/2020 *Topic was postponed. The date shown is not the original due date. Allergies as of 10/13/2017  Review Complete On: 10/13/2017 By: Sheri Roberts Severity Noted Reaction Type Reactions Egg  12/08/2015    Nausea and Vomiting Flexeril [Cyclobenzaprine]  01/13/2015    Nausea and Vomiting Current Immunizations  Never Reviewed No immunizations on file. Not reviewed this visit Vitals BP Pulse Temp Resp Height(growth percentile) Weight(growth percentile) 106/88 (BP 1 Location: Right arm, BP Patient Position: Sitting) 86 98.2 °F (36.8 °C) (Oral) 16 5' 10\" (1.778 m) 124 lb 4.8 oz (56.4 kg) LMP SpO2 BMI OB Status Smoking Status 03/31/2017 (Exact Date) 99% 17.84 kg/m2 Pregnant Never Smoker BMI and BSA Data Body Mass Index Body Surface Area  
 17.84 kg/m 2 1.67 m 2 Preferred Pharmacy Pharmacy Name Phone [facility] [address] [place] [address] Your Updated Medication List  
  
   
This list is accurate as of: 10/13/17  2:38 PM.  Always use your most recent med list.  
  
  
  
  
 ferrous sulfate 325 mg (65 mg iron) tablet Take one tab po daily * levothyroxine 112 mcg tablet Commonly known as:  SYNTHROID Take 1 Tab by mouth Daily (before breakfast). * levothyroxine 137 mcg tablet Commonly known as:  SYNTHROID Take 137 mcg by mouth Daily (before breakfast). MIGRAINE PO Take  by mouth. ondansetron 4 mg disintegrating tablet Commonly known as:  ZOFRAN ODT  
 Take 1 Tab by mouth every eight (8) hours as needed for Nausea. PRENATAL PO Take  by mouth. VITAMIN D2 PO Take  by mouth. * Notice: This list has 2 medication(s) that are the same as other medications prescribed for you. Read the directions carefully, and ask your doctor or other care provider to review them with you. Introducing [facility] & HEALTH SERVICES! [facility] introduces [employer] patient portal. Now you can access parts of your medical record, email your doctor's office, and request medication refills online. 1. In your internet browser, go to [email or web address] 2. Click on the First Time User? Click Here link in the Sign In box. You will see the New Member Sign Up page. 3. Enter your [employer] Access Code exactly as it appears below. You will not need to use this code after youve completed the sign-up process. If you do not sign up before the expiration date, you must request a new code. · [employer] Access Code: [email or web address] Expires: 10/25/2017  2:13 PM 
 
4. Enter the last four digits of your Social Security Number (xxxx) and Date of Birth (mm/dd/yyyy) as indicated and click Submit. You will be taken to the next sign-up page. 5. Create a [employer] ID. This will be your [employer] login ID and cannot be changed, so think of one that is secure and easy to remember. 6. Create a [employer] password. You can change your password at any time. 7. Enter your Password Reset Question and Answer. This can be used at a later time if you forget your password. 8. Enter your e-mail address. You will receive e-mail notification when new information is available in [address] 9. Click Sign Up. You can now view and download portions of your medical record. 10. Click the Download Summary menu link to download a portable copy of your medical information.  
 
If you have questions, please visit the Frequently Asked Questions section of the [employer]. Remember, [employer] is NOT to be used for urgent needs. For medical emergencies, dial 911. Now available from your iPhone and Android! Please provide this summary of care documentation to your next provider. Your primary care clinician is listed as Cornelia Akbar. If you have any questions after today's visit, please call [phone].

## 2017-10-13 NOTE — PROGRESS NOTES
Chief Complaint   Patient presents with    Hypothyroidism     1 month follow up      1. Have you been to the ER, urgent care clinic since your last visit? Hospitalized since your last visit? Yes Reason for visit: contractions 10/10/17 released next day    2. Have you seen or consulted any other health care providers outside of the [address] [place] [address] since your last visit? Include any pap smears or colon screening.  Yes [facility]: [facility]

## 2017-10-14 NOTE — PROGRESS NOTES
Chief Complaint   Patient presents with    Hypothyroidism     1 month follow up    . SUBECTIVE:    Rahul Treadwell is a 28 y.o. female Comes in for return visit stating that she has done well. She saw her gynecologist and most recently had a glucose tolerance test.  TSH was drawn at the time, she is waiting to get the results. She asked the gynecologist to send me the results. She is no longer as dyspneic as she previously was. Echocardiogram ws normal.  The presumed etiology was diaphragmatic limitation related to the pregnancy superimposed on a small frame. Current Outpatient Prescriptions   Medication Sig Dispense Refill    levothyroxine (SYNTHROID) 137 mcg tablet Take 137 mcg by mouth Daily (before breakfast). 30 Tab 0    levothyroxine (SYNTHROID) 112 mcg tablet Take 1 Tab by mouth Daily (before breakfast). 30 Tab 1    ferrous sulfate 325 mg (65 mg iron) tablet Take one tab po daily 30 Tab 3    ondansetron (ZOFRAN ODT) 4 mg disintegrating tablet Take 1 Tab by mouth every eight (8) hours as needed for Nausea. 20 Tab 1    PRENATAL VIT/IRON FUMARATE/FA (PRENATAL PO) Take  by mouth.  ASPIRIN/ACETAMINOPHEN/CAFFEINE (MIGRAINE PO) Take  by mouth.  ERGOCALCIFEROL, VITAMIN D2, (VITAMIN D2 PO) Take  by mouth.        Past Medical History:   Diagnosis Date    Arthritis     Scoliosis     Sickle cell trait ([facility] [address]     Thyroid disease      Past Surgical History:   Procedure Laterality Date    HX GYN      HX HERNIA REPAIR  0/01/34    Open umbilical hernia repair     Allergies   Allergen Reactions    Egg Nausea and Vomiting    Flexeril [Cyclobenzaprine] Nausea and Vomiting       REVIEW OF SYSTEMS:  Review of Systems - Negative except   ENT ROS: negative for - headaches, hearing change, nasal congestion, oral lesions, tinnitus, visual changes or   Respiratory ROS: no cough, shortness of breath, or wheezing  Cardiovascular ROS: no chest pain or dyspnea on exertion  Gastrointestinal ROS: no abdominal pain, change in bowel habits, or black or blood  Genito-Urinary ROS: no dysuria, trouble voiding, or hematuria  Musculoskeletal ROS: negative  Neurological ROS: no TIA or stroke symptoms      Social History     Social History    Marital status: SINGLE     Spouse name: N/A    Number of children: N/A    Years of education: N/A     Social History Main Topics    Smoking status: Never Smoker    Smokeless tobacco: Never Used    Alcohol use No    Drug use: No    Sexual activity: Yes     Partners: Male     Other Topics Concern    None     Social History Narrative   r  Family History   Problem Relation Age of Onset    Arthritis-osteo Mother     Hypertension Mother     Kidney Disease Mother     Kidney Disease Father     Asthma Father     Diabetes Maternal Aunt     Asthma Paternal Aunt     Diabetes Paternal Aunt     Cancer Paternal Grandmother     Asthma Sister     Cancer Maternal Uncle      liver cancer    Heart Attack Maternal Uncle        OBJECTIVE:  Visit Vitals    /88 (BP 1 Location: Right arm, BP Patient Position: Sitting)    Pulse 86    Temp 98.2 °F (36.8 °C) (Oral)    Resp 16    Ht 5' 10\" (1.778 m)    Wt 124 lb 4.8 oz (56.4 kg)    LMP 03/31/2017 (Exact Date)    SpO2 99%    BMI 17.84 kg/m2     ENT: perrla,  eom intact  NECK: supple. Thyroid normal, no JVD  CHEST: clear to ascultation and percussion   HEART: regular rate and rhythm  ABD: soft, bowel sounds active,   EXTREMITIES: no edema, pulse 1+  INTEGUMENT: clear      ASSESSMENT:  1. Hypothyroidism due to medication    2. Dyspnea, unspecified type        PLAN:    1. I will await the results of her TSH. 2. Her dyspnea has resolved. 3. Her pregnancy is proceeding nicely. Her biggest problem is her frame, which is quite small. Follow-up Disposition:  Return in about 2 months (around 12/13/2017) for RTO 1 month for TSH.       Dinorah Crespo MD

## 2017-10-25 RX ORDER — LEVOTHYROXINE SODIUM 150 UG/1
137 TABLET ORAL
Qty: 30 TAB | Refills: 11 | Status: SHIPPED | OUTPATIENT
Start: 2017-10-25 | End: 2018-01-15 | Stop reason: SDUPTHER

## 2017-10-25 NOTE — TELEPHONE ENCOUNTER
WE GET A CALL FROM PATIENT GYN THAT HER TSH IS 6.6. PER DR. RAUSCH WE ARE TO INCREASE HER LEVOTHYROXINE TO 150MCG. PATIENT TO RETURN IN 1 MONTH FOR REPEAT TSH.

## 2017-10-31 ENCOUNTER — CLINICAL SUPPORT (OUTPATIENT)
Dept: INTERNAL MEDICINE CLINIC | Age: 32
End: 2017-10-31

## 2017-10-31 DIAGNOSIS — Z23 ENCOUNTER FOR IMMUNIZATION: Primary | ICD-10-CM

## 2018-01-12 ENCOUNTER — OFFICE VISIT (OUTPATIENT)
Dept: INTERNAL MEDICINE CLINIC | Age: 33
End: 2018-01-12

## 2018-01-12 VITALS
TEMPERATURE: 97.7 F | DIASTOLIC BLOOD PRESSURE: 89 MMHG | BODY MASS INDEX: 16.3 KG/M2 | OXYGEN SATURATION: 96 % | SYSTOLIC BLOOD PRESSURE: 123 MMHG | WEIGHT: 113.9 LBS | HEIGHT: 70 IN | RESPIRATION RATE: 16 BRPM | HEART RATE: 85 BPM

## 2018-01-12 DIAGNOSIS — E03.2 HYPOTHYROIDISM DUE TO MEDICATION: Primary | ICD-10-CM

## 2018-01-12 DIAGNOSIS — G44.219 EPISODIC TENSION-TYPE HEADACHE, NOT INTRACTABLE: ICD-10-CM

## 2018-01-12 DIAGNOSIS — D57.3 SICKLE CELL TRAIT (HCC): ICD-10-CM

## 2018-01-12 RX ORDER — IBUPROFEN 800 MG/1
TABLET ORAL
COMMUNITY

## 2018-01-12 NOTE — MR AVS SNAPSHOT
Visit Information Date & Time Provider Department Dept. Phone Encounter #  
 1/12/2018 12:15 PM Esperanza Machado MD [facility] [phone] [id number] Upcoming Health Maintenance Date Due Influenza Age 5 to Adult 8/1/2017 PAP AKA CERVICAL CYTOLOGY 5/12/2020 DTaP/Tdap/Td series (2 - Td) 10/31/2027 Allergies as of 1/12/2018  Review Complete On: 1/12/2018 By: Lamar Ornelas Severity Noted Reaction Type Reactions Egg  12/08/2015    Nausea and Vomiting Flexeril [Cyclobenzaprine]  01/13/2015    Nausea and Vomiting Current Immunizations  Never Reviewed Name Date Tdap 10/31/2017 Not reviewed this visit Vitals BP Pulse Temp Resp Height(growth percentile) Weight(growth percentile) 123/89 (BP 1 Location: Right arm, BP Patient Position: Sitting) 85 97.7 °F (36.5 °C) (Oral) 16 5' 10\" (1.778 m) 113 lb 14.4 oz (51.7 kg) LMP SpO2 Breastfeeding? BMI OB Status Smoking Status 03/31/2017 (Exact Date) 96% Yes 16.34 kg/m2 Breastfeeding Never Smoker Vitals History BMI and BSA Data Body Mass Index Body Surface Area  
 16.34 kg/m 2 1.6 m 2 Preferred Pharmacy Pharmacy Name Phone [address], [address] [place] [address]  [phone] Your Updated Medication List  
  
   
This list is accurate as of: 1/12/18  3:38 PM.  Always use your most recent med list.  
  
  
  
  
 ferrous sulfate 325 mg (65 mg iron) tablet Take one tab po daily  
  
 ibuprofen 800 mg tablet Commonly known as:  MOTRIN Take  by mouth.  
  
 levothyroxine 150 mcg tablet Commonly known as:  SYNTHROID Take 1 Tab by mouth Daily (before breakfast). MIGRAINE PO Take  by mouth. ondansetron 4 mg disintegrating tablet Commonly known as:  ZOFRAN ODT Take 1 Tab by mouth every eight (8) hours as needed for Nausea. PRENATAL PO Take  by mouth.   
  
 VITAMIN D2 PO  
 Take  by mouth. Introducing [facility] & HEALTH SERVICES! Samaria Lowery introduces PowWowHR patient portal. Now you can access parts of your medical record, email your doctor's office, and request medication refills online. 1. In your internet browser, go to [email or web address] 2. Click on the First Time User? Click Here link in the Sign In box. You will see the New Member Sign Up page. 3. Enter your PowWowHR Access Code exactly as it appears below. You will not need to use this code after youve completed the sign-up process. If you do not sign up before the expiration date, you must request a new code. · PowWowHR Access Code: [email or web address] Expires: 4/12/2018  3:37 PM 
 
4. Enter the last four digits of your Social Security Number (xxxx) and Date of Birth (mm/dd/yyyy) as indicated and click Submit. You will be taken to the next sign-up page. 5. Create a PowWowHR ID. This will be your PowWowHR login ID and cannot be changed, so think of one that is secure and easy to remember. 6. Create a PowWowHR password. You can change your password at any time. 7. Enter your Password Reset Question and Answer. This can be used at a later time if you forget your password. 8. Enter your e-mail address. You will receive e-mail notification when new information is available in [address] 9. Click Sign Up. You can now view and download portions of your medical record. 10. Click the Download Summary menu link to download a portable copy of your medical information. If you have questions, please visit the Frequently Asked Questions section of the PowWowHR website. Remember, PowWowHR is NOT to be used for urgent needs. For medical emergencies, dial 911. Now available from your iPhone and Android! Please provide this summary of care documentation to your next provider. Your primary care clinician is listed as Cornelia Headley  If you have any questions after today's visit, please call [phone].

## 2018-01-12 NOTE — PROGRESS NOTES
Chief Complaint   Patient presents with    Thyroid Problem     follow up      1. Have you been to the ER, urgent care clinic since your last visit? Hospitalized since your last visit? Yes When: 12/26/17 Where: [address] [place] [address] Reason for visit: child birth    2. Have you seen or consulted any other health care providers outside of the [address] [place] [address] Leon[place] since your last visit? Include any pap smears or colon screening.  [address] [place] [address]

## 2018-01-13 PROBLEM — G44.219 EPISODIC TENSION-TYPE HEADACHE, NOT INTRACTABLE: Status: ACTIVE | Noted: 2018-01-13

## 2018-01-13 LAB — TSH SERPL DL<=0.005 MIU/L-ACNC: 0.04 UIU/ML (ref 0.45–4.5)

## 2018-01-13 NOTE — PROGRESS NOTES
Chief Complaint   Patient presents with    Thyroid Problem     follow up    . SUBECTIVE:    Yousif Betancourt is a 35 y.o. female comes in for follow-up. She delivered her infant two weeks ago. She is still taking the same dose of thyroid medication. She has lost all of the weight that she gained with her pregnancy. She is currently 113 lb, which makes her BMI 16. Her sickle cell trait has not been a problem. She takes ibuprofen PRN for headaches. The headaches are better since the delivery of her infant. Current Outpatient Prescriptions   Medication Sig Dispense Refill    ibuprofen (MOTRIN) 800 mg tablet Take  by mouth.  levothyroxine (SYNTHROID) 150 mcg tablet Take 1 Tab by mouth Daily (before breakfast). 30 Tab 11    PRENATAL VIT/IRON FUMARATE/FA (PRENATAL PO) Take  by mouth.  ferrous sulfate 325 mg (65 mg iron) tablet Take one tab po daily 30 Tab 3    ondansetron (ZOFRAN ODT) 4 mg disintegrating tablet Take 1 Tab by mouth every eight (8) hours as needed for Nausea. 20 Tab 1    ASPIRIN/ACETAMINOPHEN/CAFFEINE (MIGRAINE PO) Take  by mouth.  ERGOCALCIFEROL, VITAMIN D2, (VITAMIN D2 PO) Take  by mouth.        Past Medical History:   Diagnosis Date    Arthritis     Scoliosis     Sickle cell trait ([facility]     Thyroid disease      Past Surgical History:   Procedure Laterality Date    HX GYN      HX HERNIA REPAIR  4/63/44    Open umbilical hernia repair     Allergies   Allergen Reactions    Egg Nausea and Vomiting    Flexeril [Cyclobenzaprine] Nausea and Vomiting       REVIEW OF SYSTEMS:  Review of Systems - Negative except   ENT ROS: negative for - headaches, hearing change, nasal congestion, oral lesions, tinnitus, visual changes or   Respiratory ROS: no cough, shortness of breath, or wheezing  Cardiovascular ROS: no chest pain or dyspnea on exertion  Gastrointestinal ROS: no abdominal pain, change in bowel habits, or black or blood  Genito-Urinary ROS: no dysuria, trouble voiding, or hematuria  Musculoskeletal ROS: negative  Neurological ROS: no TIA or stroke symptoms      Social History     Social History    Marital status: SINGLE     Spouse name: N/A    Number of children: N/A    Years of education: N/A     Social History Main Topics    Smoking status: Never Smoker    Smokeless tobacco: Never Used    Alcohol use No    Drug use: No    Sexual activity: Yes     Partners: Male     Other Topics Concern    None     Social History Narrative   r  Family History   Problem Relation Age of Onset    Arthritis-osteo Mother     Hypertension Mother     Kidney Disease Mother     Kidney Disease Father     Asthma Father     Diabetes Maternal Aunt     Asthma Paternal Aunt     Diabetes Paternal Aunt     Cancer Paternal Grandmother     Asthma Sister     Cancer Maternal Uncle      liver cancer    Heart Attack Maternal Uncle        OBJECTIVE:  Visit Vitals    /89 (BP 1 Location: Right arm, BP Patient Position: Sitting)    Pulse 85    Temp 97.7 °F (36.5 °C) (Oral)    Resp 16    Ht 5' 10\" (1.778 m)    Wt 113 lb 14.4 oz (51.7 kg)    LMP 03/31/2017 (Exact Date)    SpO2 96%    Breastfeeding Yes    BMI 16.34 kg/m2     ENT: perrla,  eom intact  NECK: supple. Thyroid normal, no JVD  CHEST: clear to ascultation and percussion   HEART: regular rate and rhythm  ABD: soft, bowel sounds active,   EXTREMITIES: no edema, pulse 1+  INTEGUMENT: clear      ASSESSMENT:  1. Hypothyroidism due to medication    2. Sickle cell trait (HCC)    3. Episodic tension-type headache, not intractable      Diagnoses and all orders for this visit:    1. Hypothyroidism due to medication  -     TSH 3RD GENERATION    2. Sick[facility] cell trait ([facility]  Assessment & Plan:  Stable, based on history, physical exam and review of pertinent labs, studies and medications; meds reconciled; continue current treatment plan.   Lab Results   Component Value Date/Time    WBC 8.1 04/14/2017 09:49 AM    HGB 10.9 04/14/2017 09:49 AM    HCT 36.6 04/14/2017 09:49 AM    PLATELET 134 56/85/2746 09:49 AM    Creatinine 0.52 07/27/2017 02:07 PM    BUN 9 07/27/2017 02:07 PM    Potassium 3.8 07/27/2017 02:07 PM         3. Episodic tension-type headache, not intractable        PLAN:    1. TSH will be checked and adjustments will be made on her thyroid medication as needed. She is currently delivering soon so ideally if a change is required, it would be after that. She will therefore return to the office in three months for a repeat TSH.    2. Her sickle cell trait is not an issue at this point. 3. I strongly suggested she discontinue the ibuprofen. .  Orders Placed This Encounter    TSH 3RD GENERATION    ibuprofen (MOTRIN) 800 mg tablet       Follow-up Disposition:  Return in about 6 months (around 7/12/2018), or RTO 3 monts for TSH.       Gwyn Gr MD

## 2018-01-13 NOTE — ASSESSMENT & PLAN NOTE
Stable, based on history, physical exam and review of pertinent labs, studies and medications; meds reconciled; continue current treatment plan.   Lab Results   Component Value Date/Time    WBC 8.1 04/14/2017 09:49 AM    HGB 10.9 04/14/2017 09:49 AM    HCT 36.6 04/14/2017 09:49 AM    PLATELET 222 39/04/2427 09:49 AM    Creatinine 0.52 07/27/2017 02:07 PM    BUN 9 07/27/2017 02:07 PM    Potassium 3.8 07/27/2017 02:07 PM

## 2018-01-15 RX ORDER — LEVOTHYROXINE SODIUM 125 UG/1
125 TABLET ORAL
Qty: 90 TAB | Refills: 3 | Status: SHIPPED | OUTPATIENT
Start: 2018-01-15 | End: 2018-04-12 | Stop reason: SDUPTHER

## 2018-03-19 ENCOUNTER — OFFICE VISIT (OUTPATIENT)
Dept: INTERNAL MEDICINE CLINIC | Age: 33
End: 2018-03-19

## 2018-03-19 VITALS
SYSTOLIC BLOOD PRESSURE: 116 MMHG | HEIGHT: 70 IN | WEIGHT: 110 LBS | DIASTOLIC BLOOD PRESSURE: 80 MMHG | OXYGEN SATURATION: 97 % | BODY MASS INDEX: 15.75 KG/M2 | HEART RATE: 63 BPM | TEMPERATURE: 98.4 F | RESPIRATION RATE: 16 BRPM

## 2018-03-19 DIAGNOSIS — N28.1 RENAL CYST: Primary | ICD-10-CM

## 2018-03-19 DIAGNOSIS — R10.2 PELVIC PAIN: ICD-10-CM

## 2018-03-19 DIAGNOSIS — E03.2 HYPOTHYROIDISM DUE TO MEDICATION: ICD-10-CM

## 2018-03-19 NOTE — PROGRESS NOTES
Chief Complaint   Patient presents with    Cyst     cyst on left kidney   . SUBECTIVE:    Isaias Lu is a 35 y.o. female comes in for return visit concerned about chronic pelvic pain for which she has seen a gynecologist.  Ultrasound was done and there was a 6 cm structure on the lower pole of the left kidney. She came in for further characterization of this. She also has from a GYN standpoint a 2.1 cm lesion on the right adnexa representing a cyst.     Thyroid supplement has been doing quite well with a TSH that was acceptable. She works at [employer] where she has been for the last year and wants to go back to work but cannot because of the pain. Current Outpatient Prescriptions   Medication Sig Dispense Refill    levothyroxine (SYNTHROID) 125 mcg tablet Take 1 Tab by mouth Daily (before breakfast). 90 Tab 3    ibuprofen (MOTRIN) 800 mg tablet Take  by mouth.  ferrous sulfate 325 mg (65 mg iron) tablet Take one tab po daily 30 Tab 3    ondansetron (ZOFRAN ODT) 4 mg disintegrating tablet Take 1 Tab by mouth every eight (8) hours as needed for Nausea. 20 Tab 1    PRENATAL VIT/IRON FUMARATE/FA (PRENATAL PO) Take  by mouth.  ASPIRIN/ACETAMINOPHEN/CAFFEINE (MIGRAINE PO) Take  by mouth.  ERGOCALCIFEROL, VITAMIN D2, (VITAMIN D2 PO) Take  by mouth.        Past Medical History:   Diagnosis Date    Arthritis     Scoliosis     Sickle cell trait ([address]     Thyroid disease      Past Surgical History:   Procedure Laterality Date    HX GYN      HX HERNIA REPAIR  2/79/82    Open umbilical hernia repair     Allergies   Allergen Reactions    Egg Nausea and Vomiting    Flexeril [Cyclobenzaprine] Nausea and Vomiting       REVIEW OF SYSTEMS:  Review of Systems - Negative except   ENT ROS: negative for - headaches, hearing change, nasal congestion, oral lesions, tinnitus, visual changes or   Respiratory ROS: no cough, shortness of breath, or wheezing  Cardiovascular ROS: no chest pain or dyspnea on exertion  Gastrointestinal ROS: no abdominal pain, change in bowel habits, or black or blood  Genito-Urinary ROS: no dysuria, trouble voiding, or hematuria  Musculoskeletal ROS: negative  Neurological ROS: no TIA or stroke symptoms      Social History     Social History    Marital status: SINGLE     Spouse name: N/A    Number of children: N/A    Years of education: N/A     Social History Main Topics    Smoking status: Never Smoker    Smokeless tobacco: Never Used    Alcohol use No    Drug use: No    Sexual activity: Yes     Partners: Male     Other Topics Concern    None     Social History Narrative   r  Family History   Problem Relation Age of Onset    Arthritis-osteo Mother     Hypertension Mother     Kidney Disease Mother     Kidney Disease Father     Asthma Father     Diabetes Maternal Aunt     Asthma Paternal Aunt     Diabetes Paternal Aunt     Cancer Paternal Grandmother     Asthma Sister     Cancer Maternal Uncle      liver cancer    Heart Attack Maternal Uncle        OBJECTIVE:  Visit Vitals    /80 (BP 1 Location: Right arm, BP Patient Position: Sitting)    Pulse 63    Temp 98.4 °F (36.9 °C) (Oral)    Resp 16    Ht 5' 10\" (1.778 m)    Wt 110 lb (49.9 kg)    SpO2 97%    BMI 15.78 kg/m2     ENT: perrla,  eom intact  NECK: supple. Thyroid normal, no JVD  CHEST: clear to ascultation and percussion   HEART: regular rate and rhythm  ABD: soft, bowel sounds active,   EXTREMITIES: no edema, pulse 1+  INTEGUMENT: clear      ASSESSMENT:  1. Renal cyst    2. Hypothyroidism due to medication    3. Pelvic pain        PLAN:    1. The lesion on her kidney is very small. I do not think it represents anything significant but for completeness sake a CT scan will be obtained. 2. TSH will be checked in view of her hypothyroidism. 3. She will follow-up with her gynecologist regarding her pelvic pain. Hopefully it does not represent endometriosis.         .  Orders Placed This Encounter    CT ABD WO CONT    TSH 3RD GENERATION       Follow-up Disposition:  Return in about 6 months (around 9/19/2018).       Rosana Dawkins MD

## 2018-03-19 NOTE — MR AVS SNAPSHOT
[address] [facility] 
 
 
 [id number] 
[phone] Patient: Katia Sebastian MRN: [id number] Visit Information Date & Time Provider Department Dept. Phone Encounter #  
 3/19/2018 11:30 AM Marc Casillas MD [facility] Sports Medicine and [address] [id number] Your Appointments 7/13/2018  9:15 AM  
Any with Marc Casillas MD  
[address] [place] [address] and [facility] Appt Note: follow up 6mnths  
 [address] [facility]  
  
   
 [id number]  
  
    
 9/20/2018  9:00 AM  
Any with Marc Casillas MD  
[address] [place] [address] and [facility]) Appt Note: follow up  
 [address]  
[phone] Upcoming Health Maintenance Date Due Influenza Age 5 to Adult 8/1/2017 PAP AKA CERVICAL CYTOLOGY 5/12/2020 DTaP/Tdap/Td series (2 - Td) 10/31/2027 Allergies as of 3/19/2018  Review Complete On: 3/19/2018 By: Karen Solomon Severity Noted Reaction Type Reactions Egg  12/08/2015    Nausea and Vomiting Flexeril [Cyclobenzaprine]  01/13/2015    Nausea and Vomiting Current Immunizations  Never Reviewed Name Date Tdap 10/31/2017 Not reviewed this visit You Were Diagnosed With   
  
 Codes Comments Renal cyst    -  Primary ICD-10-CM: N28.1 ICD-9-CM: 753.10 Hypothyroidism due to medication     ICD-10-CM: E03.2 ICD-9-CM: 244.8, E980.5 Pelvic pain     ICD-10-CM: R10.2 ICD-9-CM: BQD0960 Vitals BP Pulse Temp Resp Height(growth percentile) Weight(growth percentile) 116/80 (BP 1 Location: Right arm, BP Patient Position: Sitting) 63 98.4 °F (36.9 °C) (Oral) 16 5' 10\" (1.778 m) 110 lb (49.9 kg) SpO2 BMI OB Status Smoking Status 97% 15.78 kg/m2 Breastfeeding Never Smoker Vitals History BMI and BSA Data Body Mass Index Body Surface Area 15.78 kg/m 2 1.57 m 2 Preferred Pharmacy Pharmacy Name Phone [address] [place] [address], [address] [place] [address] Dr [phone] Your Updated Medication List  
  
   
This list is accurate as of 3/19/18  1:50 PM.  Always use your most recent med list.  
  
  
  
  
 ferrous sulfate 325 mg (65 mg iron) tablet Take one tab po daily  
  
 ibuprofen 800 mg tablet Commonly known as:  MOTRIN Take  by mouth.  
  
 levothyroxine 125 mcg tablet Commonly known as:  SYNTHROID Take 1 Tab by mouth Daily (before breakfast). MIGRAINE PO Take  by mouth. ondansetron 4 mg disintegrating tablet Commonly known as:  ZOFRAN ODT Take 1 Tab by mouth every eight (8) hours as needed for Nausea. PRENATAL PO Take  by mouth. VITAMIN D2 PO Take  by mouth. We Performed the Following TSH 3RD GENERATION [88131 CPT(R)] To-Do List   
 03/26/2018 Imaging:  CT ABD WO CONT Introducing [facility] & HEALTH SERVICES! [employer] introduces Soundtracker patient portal. Now you can access parts of your medical record, email your doctor's office, and request medication refills online. 1. In your internet browser, go to [email or web address] 2. Click on the First Time User? Click Here link in the Sign In box. You will see the New Member Sign Up page. 3. Enter your Soundtracker Access Code exactly as it appears below. You will not need to use this code after youve completed the sign-up process. If you do not sign up before the expiration date, you must request a new code. · Soundtracker Access Code: [email or web address] Expires: 4/12/2018  4:37 PM 
 
4. Enter the last four digits of your Social Security Number (xxxx) and Date of Birth (mm/dd/yyyy) as indicated and click Submit. You will be taken to the next sign-up page. 5. Create a Soundtracker ID.  This will be your Soundtracker login ID and cannot be changed, so think of one that is secure and easy to remember. 6. Create a Resilient [employer] Systems password. You can change your password at any time. 7. Enter your Password Reset Question and Answer. This can be used at a later time if you forget your password. 8. Enter your e-mail address. You will receive e-mail notification when new information is available in [address] 9. Click Sign Up. You can now view and download portions of your medical record. 10. Click the Download Summary menu link to download a portable copy of your medical information. If you have questions, please visit the Frequently Asked Questions section of the [employer]ient [employer] Systems website. Remember, [employer] Systems is NOT to be used for urgent needs. For medical emergencies, dial 911. Now available from your iPhone and Android! Please provide this summary of care documentation to your next provider. Your primary care clinician is listed as Cornelia Akbar. If you have any questions after today's visit, please call [phone].

## 2018-03-19 NOTE — PROGRESS NOTES
Chief Complaint   Patient presents with    Cyst     cyst on left kidney     1. Have you been to the ER, urgent care clinic since your last visit? Hospitalized since your last visit? No    2. Have you seen or consulted any other health care providers outside of the [address] [place] [address] since your last visit? Include any pap smears or colon screening.  No

## 2018-03-20 LAB — TSH SERPL DL<=0.005 MIU/L-ACNC: <0.006 UIU/ML (ref 0.45–4.5)

## 2018-03-29 DIAGNOSIS — N28.9 RENAL LESION: Primary | ICD-10-CM

## 2018-04-12 RX ORDER — LEVOTHYROXINE SODIUM 75 UG/1
TABLET ORAL
Qty: 30 TAB | Refills: 11 | Status: SHIPPED | OUTPATIENT
Start: 2018-04-12 | End: 2018-08-15 | Stop reason: SDUPTHER

## 2018-04-26 ENCOUNTER — TELEPHONE (OUTPATIENT)
Dept: INTERNAL MEDICINE CLINIC | Age: 33
End: 2018-04-26

## 2018-04-26 NOTE — TELEPHONE ENCOUNTER
I called patient and left a message stating she had a ct abd ordered for her and if she would like us to schedule that for her. This is the 2 nd attempt to try and reach the patient.

## 2018-08-09 ENCOUNTER — OFFICE VISIT (OUTPATIENT)
Dept: INTERNAL MEDICINE CLINIC | Age: 33
End: 2018-08-09

## 2018-08-09 VITALS
HEIGHT: 70 IN | DIASTOLIC BLOOD PRESSURE: 80 MMHG | WEIGHT: 104 LBS | RESPIRATION RATE: 16 BRPM | OXYGEN SATURATION: 99 % | HEART RATE: 75 BPM | BODY MASS INDEX: 14.89 KG/M2 | TEMPERATURE: 98.3 F | SYSTOLIC BLOOD PRESSURE: 121 MMHG

## 2018-08-09 DIAGNOSIS — K29.00 ACUTE SUPERFICIAL GASTRITIS WITHOUT HEMORRHAGE: ICD-10-CM

## 2018-08-09 DIAGNOSIS — J30.89 SEASONAL ALLERGIC RHINITIS DUE TO OTHER ALLERGIC TRIGGER: Primary | ICD-10-CM

## 2018-08-09 DIAGNOSIS — E03.2 HYPOTHYROIDISM DUE TO MEDICATION: ICD-10-CM

## 2018-08-09 DIAGNOSIS — M25.512 ACUTE PAIN OF LEFT SHOULDER: ICD-10-CM

## 2018-08-09 NOTE — PROGRESS NOTES
Chief Complaint   Patient presents with    Abdominal Pain     Patient states that she has been having abdominal pain to the point that she cannot finish a meal due to the pain x 1 to 2 weeks. She states that she is concerned with TSH levels.  Chest Pain     Patient complains of chest pain on the left side. 1. Have you been to the ER, urgent care clinic since your last visit? Hospitalized since your last visit? No    2. Have you seen or consulted any other health care providers outside of the [facility] since your last visit? Include any pap smears or colon screening.  No

## 2018-08-09 NOTE — MR AVS SNAPSHOT
[facility] 
 
 
 [address] [address] 
[phone] Patient: Butch Mathis MRN: [id number]:[id number] Visit Information Date & Time Provider Department Dept. Phone Encounter #  
 8/9/2018 12:30 PM Janny Santoyo MD [facility] Sports Medicine and Primary Care [phone] Your Appointments 2/4/2019 10:30 AM  
Any with Janny Santoyo MD  
[address] [place] [address] and Primary Care [address] [place] [address]) Appt Note: 6 MONTH FOLLOW UP  
 [address] 1 [facility]  
  
   
 [address] [address] Upcoming Health Maintenance Date Due Influenza Age 5 to Adult 8/1/2018 PAP AKA CERVICAL CYTOLOGY 5/12/2020 DTaP/Tdap/Td series (2 - Td) 10/31/2027 Allergies as of 8/9/2018  Review Complete On: 8/9/2018 By: Paula Figueredo Severity Noted Reaction Type Reactions Egg  12/08/2015    Nausea and Vomiting Flexeril [Cyclobenzaprine]  01/13/2015    Nausea and Vomiting Current Immunizations  Never Reviewed Name Date Tdap 10/31/2017 Not reviewed this visit You Were Diagnosed With   
  
 Codes Comments Seasonal allergic rhinitis due to other allergic trigger    -  Primary ICD-10-CM: J30.89 ICD-9-CM: 477.8 Acute superficial gastritis without hemorrhage     ICD-10-CM: K29.00 ICD-9-CM: 535.40 Hypothyroidism due to medication     ICD-10-CM: E03.2 ICD-9-CM: 244.8, E980.5 Acute pain of left shoulder     ICD-10-CM: M25.512 ICD-9-CM: 719.41 Vitals BP Pulse Temp Resp Height(growth percentile) Weight(growth percentile) 121/80 75 98.3 °F (36.8 °C) (Oral) 16 5' 10\" (1.778 m) 104 lb (47.2 kg) SpO2 BMI OB Status Smoking Status 99% 14.92 kg/m2 Breastfeeding Never Smoker Vitals History BMI and BSA Data Body Mass Index Body Surface Area 14.92 kg/m 2 1.53 m 2 Preferred Pharmacy Pharmacy Name Phone Tabitha Cullen [facility], [address] [place] [address]  [phone] Your Updated Medication List  
  
   
This list is accurate as of 8/9/18  1:58 PM.  Always use your most recent med list.  
  
  
  
  
 ferrous sulfate 325 mg (65 mg iron) tablet Take one tab po daily  
  
 ibuprofen 800 mg tablet Commonly known as:  MOTRIN Take  by mouth.  
  
 levothyroxine 75 mcg tablet Commonly known as:  SYNTHROID Take 1 tablet daily on empty stomach MIGRAINE PO Take  by mouth. ondansetron 4 mg disintegrating tablet Commonly known as:  ZOFRAN ODT Take 1 Tab by mouth every eight (8) hours as needed for Nausea. PRENATAL PO Take  by mouth. VITAMIN D2 PO Take  by mouth. We Performed the Following TSH 3RD GENERATION [76613 CPT(R)] Introducing \[facility] [place] [facility]\"" & [facility] SERVICES! Lana Garcia introduces BUILD patient portal. Now you can access parts of your medical record, email your doctor's office, and request medication refills online. 1. In your internet browser, go to [email or web address] 2. Click on the First Time User? Click Here link in the Sign In box. You will see the New Member Sign Up page. 3. Enter your BUILD Access Code exactly as it appears below. You will not need to use this code after youve completed the sign-up process. If you do not sign up before the expiration date, you must request a new code. · BUILD Access Code: [email or web address] Expires: 11/7/2018  1:58 PM 
 
4. Enter the last four digits of your Social Security Number (xxxx) and Date of Birth (mm/dd/yyyy) as indicated and click Submit. You will be taken to the next sign-up page. 5. Create a Booskett ID. This will be your BUILD login ID and cannot be changed, so think of one that is secure and easy to remember. 6. Create a Bo[employer]kett password. You can change your password at any time. 7. Enter your Password Reset Question and Answer. This can be used at a later time if you forget your password. 8. Enter your e-mail address. You will receive e-mail notification when new information is available in [address] 9. Click Sign Up. You can now view and download portions of your medical record. 10. Click the Download Summary menu link to download a portable copy of your medical information. If you have questions, please visit the Frequently Asked Questions section of the [employer] website. Remember, [employer] is NOT to be used for urgent needs. For medical emergencies, dial 911. Now available from your iPhone and Android! Please provide this summary of care documentation to your next provider. Your primary care clinician is listed as Cornelia Abkar. If you have any questions after today's visit, please call [phone].

## 2018-08-10 LAB — TSH SERPL DL<=0.005 MIU/L-ACNC: 5.01 UIU/ML (ref 0.45–4.5)

## 2018-08-11 NOTE — PROGRESS NOTES
[facility]  [place] [address]  [address] [facility] [address]  Phone:  [phone]  Fax: [phone]       Chief Complaint   Patient presents with    Abdominal Pain     Patient states that she has been having abdominal pain to the point that she cannot finish a meal due to the pain x 1 to 2 weeks. She states that she is concerned with TSH levels.  Chest Pain     Patient complains of chest pain on the left side. .      SUBJECTIVE:    Arabella Lutz is a 35 y.o. female comes in for follow-up with several complaints. She complains of early satiety with occasional nausea present for the last several weeks. She has had no nausea vomiting. She has vague pain in the left shoulder which is aggravated when she lifts her child. She has modest nasal congestion with frequent clearing of her throat. This is accompanied by mild sneezing and intermittent itching in her of her eyes and throat. Finally she is concerned about her thyroid status. Current Outpatient Prescriptions   Medication Sig Dispense Refill    levothyroxine (SYNTHROID) 75 mcg tablet Take 1 tablet daily on empty stomach 30 Tab 11    ibuprofen (MOTRIN) 800 mg tablet Take  by mouth.  ferrous sulfate 325 mg (65 mg iron) tablet Take one tab po daily 30 Tab 3    ondansetron (ZOFRAN ODT) 4 mg disintegrating tablet Take 1 Tab by mouth every eight (8) hours as needed for Nausea. 20 Tab 1    PRENATAL VIT/IRON FUMARATE/FA (PRENATAL PO) Take  by mouth.  ASPIRIN/ACETAMINOPHEN/CAFFEINE (MIGRAINE PO) Take  by mouth.  ERGOCALCIFEROL, VITAMIN D2, (VITAMIN D2 PO) Take  by mouth.        Past Medical History:   Diagnosis Date    Arthritis     Scoliosis     Sickle cell trait ([address]     Thyroid disease      Past Surgical History:   Procedure Laterality Date    HX GYN      HX HERNIA REPAIR  1/08/81    Open umbilical hernia repair     Allergies   Allergen Reactions    Egg Nausea and Vomiting    Flexeril [Cyclobenzaprine] Nausea and Vomiting         REVIEW OF SYSTEMS:  General: negative for - chills or fever  ENT: negative for - headaches, nasal congestion or tinnitus  Respiratory: negative for - cough, hemoptysis, shortness of breath or wheezing  Cardiovascular : negative for - chest pain, edema, palpitations or shortness of breath  Gastrointestinal: negative for - abdominal pain, blood in stools, heartburn or nausea/vomiting  Genito-Urinary: no dysuria, trouble voiding, or hematuria  Musculoskeletal: negative for - gait disturbance, joint pain, joint stiffness or joint swelling  Neurological: no TIA or stroke symptoms  Hematologic: no bruises, no bleeding, no swollen glands  Integument: no lumps, mole changes, nail changes or rash  Endocrine: no malaise/lethargy or unexpected weight changes      Social History     Social History    Marital status: SINGLE     Spouse name: N/A    Number of children: N/A    Years of education: N/A     Social History Main Topics    Smoking status: Never Smoker    Smokeless tobacco: Never Used    Alcohol use No    Drug use: No    Sexual activity: Yes     Partners: Male     Other Topics Concern    None     Social History Narrative     Family History   Problem Relation Age of Onset    Arthritis-osteo Mother     Hypertension Mother     Kidney Disease Mother     Kidney Disease Father     Asthma Father     Diabetes Maternal Aunt     Asthma Paternal Aunt     Diabetes Paternal Aunt     Cancer Paternal Grandmother     Asthma Sister     Cancer Maternal Uncle      liver cancer    Heart Attack Maternal Uncle        OBJECTIVE:    Visit Vitals    /80    Pulse 75    Temp 98.3 °F (36.8 °C) (Oral)    Resp 16    Ht 5' 10\" (1.778 m)    Wt 104 lb (47.2 kg)    SpO2 99%    BMI 14.92 kg/m2     CONSTITUTIONAL: well , well nourished, appears age appropriate  EYES: perrla, eom intact  ENMT:moist mucous membranes, pharynx clear  NECK: supple.  Thyroid normal  RESPIRATORY: Chest: clear to ascultation and percussion   CARDIOVASCULAR: Heart: regular rate and rhythm  GASTROINTESTINAL: Abdomen: soft, bowel sounds active  HEMATOLOGIC: no pathological lymph nodes palpated  MUSCULOSKELETAL: Extremities: no edema, pulse 1+   INTEGUMENT: No unusual rashes or suspicious skin lesions noted. Nails appear normal.  NEUROLOGIC: non-focal exam   MENTAL STATUS: alert and oriented, appropriate affect      ASSESSMENT:  1. Seasonal allergic rhinitis due to other allergic trigger    2. Acute superficial gastritis without hemorrhage    3. Hypothyroidism due to medication    4. Acute pain of left shoulder        PLAN:  1. The patient has allergic rhinitis most likely. I suggest Claritin which should be okay with nursing. 2.  She has a probable gastritis leading to the early satiety. The only concern is whether or not a PPI is safe with nursing. I will have to review this. 3.  TSH will be checked in view of her history of I-131 ablation for hyperthyroidism. She is compliant with the thyroid supplement. 4.  The pain in her left shoulder is most likely related to her lifting of her child. There is nothing that can be done at this point other than symptomatic use of acetaminophen. Additionally she may have to switch arms as far as lifting her child is concerned. .  Orders Placed This Encounter    TSH 3RD GENERATION         Follow-up Disposition:  Return in about 6 months (around 2/9/2019).       Nayely Rosen MD

## 2018-08-15 DIAGNOSIS — E03.2 HYPOTHYROIDISM DUE TO MEDICATION: Primary | ICD-10-CM

## 2018-08-15 RX ORDER — LEVOTHYROXINE SODIUM 88 UG/1
TABLET ORAL
Qty: 30 TAB | Refills: 11 | Status: SHIPPED | OUTPATIENT
Start: 2018-08-15 | End: 2018-09-16 | Stop reason: SDUPTHER

## 2018-08-15 RX ORDER — SUCRALFATE 1 G/1
1 TABLET ORAL 4 TIMES DAILY
Qty: 120 TAB | Refills: 11 | Status: SHIPPED | OUTPATIENT
Start: 2018-08-15

## 2018-09-07 ENCOUNTER — OFFICE VISIT (OUTPATIENT)
Dept: INTERNAL MEDICINE CLINIC | Age: 33
End: 2018-09-07

## 2018-09-07 VITALS
BODY MASS INDEX: 15.17 KG/M2 | OXYGEN SATURATION: 97 % | HEIGHT: 70 IN | HEART RATE: 82 BPM | RESPIRATION RATE: 16 BRPM | DIASTOLIC BLOOD PRESSURE: 74 MMHG | WEIGHT: 106 LBS | SYSTOLIC BLOOD PRESSURE: 114 MMHG | TEMPERATURE: 98.1 F

## 2018-09-07 DIAGNOSIS — E03.2 HYPOTHYROIDISM DUE TO MEDICATION: ICD-10-CM

## 2018-09-07 DIAGNOSIS — R07.89 CHEST WALL PAIN: Primary | ICD-10-CM

## 2018-09-07 DIAGNOSIS — D68.9 COAGULOPATHY (HCC): ICD-10-CM

## 2018-09-07 NOTE — PROGRESS NOTES
Chief Complaint Patient presents with  Chest Pain 1. Have you been to the ER, urgent care clinic since your last visit? Hospitalized since your last visit? Yes When: last night Where: [facility] Reason for visit: chest pain 2. Have you seen or consulted any other health care providers outside of the [address] [place] [address] since your last visit? Include any pap smears or colon screening.  Yes Where: [facility]

## 2018-09-07 NOTE — MR AVS SNAPSHOT
[address] [facility] 
 
 
 [address] [address] 
[phone] Patient: Rahul Treadwell MRN: [id number] Visit Information Date & Time Provider Department Dept. Phone Encounter #  
 2018 12:45 PM Raj Kellogg MD [facility] Sports Medicine and Primary Care [phone] [id number] Your Appointments 2019 10:30 AM  
Any with Raj Kellogg MD  
[address] [place] [address] and Primary Care [facility] Appt Note: 6 MONTH FOLLOW UP  
 [address] 1 [facility]  
  
   
 [address] [id number] Upcoming Health Maintenance Date Due Influenza Age 5 to Adult 2018 PAP AKA CERVICAL CYTOLOGY 2020 DTaP/Tdap/Td series (2 - Td) 10/31/2027 Allergies as of 2018  Review Complete On: 2018 By: Jael Shoulder Severity Noted Reaction Type Reactions Egg  2015    Nausea and Vomiting Flexeril [Cyclobenzaprine]  2015    Nausea and Vomiting Current Immunizations  Never Reviewed Name Date Tdap 10/31/2017 Not reviewed this visit You Were Diagnosed With   
  
 Codes Comments Coagulopathy ([facility]    -  Primary ICD-10-CM: P08.2 ICD-9-CM: 286.9 Hypothyroidism due to medication     ICD-10-CM: E03.2 ICD-9-CM: 244.8, E980.5 Vitals BP Pulse Temp Resp Height(growth percentile) Weight(growth percentile) 114/74 (BP 1 Location: Right arm, BP Patient Position: Sitting) 82 98.1 °F (36.7 °C) (Oral) 16 5' 10\" (1.778 m) 106 lb (48.1 kg) SpO2 BMI OB Status Smoking Status 97% 15.21 kg/m2 Breastfeeding Never Smoker Vitals History BMI and BSA Data Body Mass Index Body Surface Area  
 15.21 kg/m 2 1.54 m 2 Preferred Pharmacy Pharmacy Name Phone [facility] [address]  [phone] Your Updated Medication List  
  
   
 This list is accurate as of 9/7/18  2:49 PM.  Always use your most recent med list.  
  
  
  
  
 ferrous sulfate 325 mg (65 mg iron) tablet Take one tab po daily  
  
 ibuprofen 800 mg tablet Commonly known as:  MOTRIN Take  by mouth.  
  
 levothyroxine 88 mcg tablet Commonly known as:  SYNTHROID Take 1 tablet daily on empty stomach MIGRAINE PO Take  by mouth. ondansetron 4 mg disintegrating tablet Commonly known as:  ZOFRAN ODT Take 1 Tab by mouth every eight (8) hours as needed for Nausea. PRENATAL PO Take  by mouth.  
  
 sucralfate 1 gram tablet Commonly known as:  Jahaira Kirks Take 1 Tab by mouth four (4) times daily. VITAMIN D2 PO Take  by mouth. We Performed the Following PROTEIN C, TOTAL M3921049 CPT(R)] PROTEIN S ANTIGEN Y601600 CPT(R)] TSH 3RD GENERATION [07413 CPT(R)] Introducing [facility] & HEALTH SERVICES! Stacy Christy introduces [employer] patient portal. Now you can access parts of your medical record, email your doctor's office, and request medication refills online. 1. In your internet browser, go to [email or web address] 2. Click on the First Time User? Click Here link in the Sign In box. You will see the New Member Sign Up page. 3. Enter your [employer] Access Code exactly as it appears below. You will not need to use this code after youve completed the sign-up process. If you do not sign up before the expiration date, you must request a new code. · Privaris Access Code: [email or web address] Expires: 11/7/2018  1:58 PM 
 
4. Enter the last four digits of your Social Security Number (xxxx) and Date of Birth (mm/dd/yyyy) as indicated and click Submit. You will be taken to the next sign-up page. 5. Create a Privaris ID. This will be your Privaris login ID and cannot be changed, so think of one that is secure and easy to remember. 6. Create a [employer] password. You can change your password at any time. 7. Enter your Password Reset Question and Answer. This can be used at a later time if you forget your password. 8. Enter your e-mail address. You will receive e-mail notification when new information is available in [address] 9. Click Sign Up. You can now view and download portions of your medical record. 10. Click the Download Summary menu link to download a portable copy of your medical information. If you have questions, please visit the Frequently Asked Questions section of the [employer] website. Remember, [employer] is NOT to be used for urgent needs. For medical emergencies, dial 911. Now available from your iPhone and Android! Please provide this summary of care documentation to your next provider. Your primary care clinician is listed as Corneila Akbar. If you have any questions after today's visit, please call [phone].

## 2018-09-08 LAB — TSH SERPL DL<=0.005 MIU/L-ACNC: 9.61 UIU/ML (ref 0.45–4.5)

## 2018-09-08 NOTE — PROGRESS NOTES
Chief Complaint Patient presents with  Chest Pain Isra Sandra SUBECTIVE: 
 
Lesia Kaplan is a 35 y.o. female comes in for return visit accompanied by her mother. She is concerned because she continues to have pain in the left side of her chest and arm. This only occurs when she lifts her daughter. She has gone to the emergency room on two separate occasions. Nothing needless to say was done. The patient is preoccupied with the fact that she has a family history of multiple members of her family having pulmonary emboli. She is wondering if there is a potential coagulapathy that she has acquired from her family members. She however had a CTA of the chest most recently which was negative. She is also hypothyroid. Her medication was reduced on her last visit because of TSH suppression. Current Outpatient Prescriptions Medication Sig Dispense Refill  levothyroxine (SYNTHROID) 88 mcg tablet Take 1 tablet daily on empty stomach 30 Tab 11  
 ondansetron (ZOFRAN ODT) 4 mg disintegrating tablet Take 1 Tab by mouth every eight (8) hours as needed for Nausea. 20 Tab 1  
 PRENATAL VIT/IRON FUMARATE/FA (PRENATAL PO) Take  by mouth.  ASPIRIN/ACETAMINOPHEN/CAFFEINE (MIGRAINE PO) Take  by mouth.  sucralfate (CARAFATE) 1 gram tablet Take 1 Tab by mouth four (4) times daily. 120 Tab 11  
 ibuprofen (MOTRIN) 800 mg tablet Take  by mouth.  ferrous sulfate 325 mg (65 mg iron) tablet Take one tab po daily 30 Tab 3  
 ERGOCALCIFEROL, VITAMIN D2, (VITAMIN D2 PO) Take  by mouth. Past Medical History:  
Diagnosis Date  Arthritis  Scoliosis  Sickle cell trait ([facility]  Thyroid disease Past Surgical History:  
Procedure Laterality Date  HX GYN    
 HX HERNIA REPAIR  3/22/16 Open umbilical hernia repair Allergies Allergen Reactions  Egg Nausea and Vomiting  Flexeril [Cyclobenzaprine] Nausea and Vomiting REVIEW OF SYSTEMS: 
 Review of Systems - Negative except ENT ROS: negative for - headaches, hearing change, nasal congestion, oral lesions, tinnitus, visual changes or Respiratory ROS: no cough, shortness of breath, or wheezing Cardiovascular ROS: no chest pain or dyspnea on exertion Gastrointestinal ROS: no abdominal pain, change in bowel habits, or black or blood Genito-Urinary ROS: no dysuria, trouble voiding, or hematuria Musculoskeletal ROS: negative Neurological ROS: no TIA or stroke symptoms Social History Social History  Marital status: SINGLE Spouse name: N/A  
 Number of children: N/A  
 Years of education: N/A Social History Main Topics  Smoking status: Never Smoker  Smokeless tobacco: Never Used  Alcohol use No  
 Drug use: No  
 Sexual activity: Yes  
  Partners: Male Other Topics Concern  None Social History Narrative  
r Family History Problem Relation Age of Onset [employer] Arthritis-osteo Mother  Hypertension Mother  Kidney Disease Mother  Kidney Disease Father  Asthma Father  Diabetes Maternal Aunt  Asthma Paternal Aunt  Diabetes Paternal Aunt  Cancer Paternal Grandmother  Asthma Sister  Cancer Maternal Uncle   
  liver cancer  Heart Attack Maternal Uncle OBJECTIVE: 
Visit Vitals  /74 (BP 1 Location: Right arm, BP Patient Position: Sitting)  Pulse 82  Temp 98.1 °F (36.7 °C) (Oral)  Resp 16  
 Ht 5' 10\" (1.778 m)  Wt 106 lb (48.1 kg)  SpO2 97%  BMI 15.21 kg/m2 ENT: perrla,  eom intact NECK: supple. Thyroid normal, no JVD CHEST: clear to ascultation and percussion HEART: regular rate and rhythm ABD: soft, bowel sounds active, EXTREMITIES: no edema, pulse 1+ INTEGUMENT: clear ASSESSMENT: 
1. Chest wall pain 2. Hypothyroidism due to medication 3. Coagulopathy ([facility] PLAN: 
 
1. I will assess the patient for familial coagulopathies.  There is no evidence of that at this time. However, for peace of mind, this will be done. 2. She has chest wall pain. This is related to lifting her daughter. She uses her left arm the entire time. Nothing can be done about this at this point. She is still nursing therefore intervention with medication will not be of any benefit. My primary role as relates to this is reassurance. 3. She does have a history of hypothyroidism with medication being adjusted at last visit. A repeat TSH will be obtained. . 
Orders Placed This Encounter  TSH 3RD GENERATION  
 PROTEIN C, TOTAL  PROTEIN S ANTIGEN  
 LUPUS ANTICOAGULANT COMP  ANTITHROMBIN III ACTIVITY Follow-up Disposition: 
Return in about 3 months (around 12/7/2018).  
 
 
Marin Massey MD

## 2018-09-09 LAB — AT III ACT/NOR PPP CHRO: 109 % (ref 75–135)

## 2018-09-10 LAB
INTERPRETATION, 117893: NORMAL
LA NT DPL PPP: 42.9 SEC (ref 0–55)
LA NT DPL/LA NT HPL PPP-RTO: 0.88 RATIO (ref 0–1.4)
SCREEN APTT: 38.9 SEC (ref 0–51.9)
SCREEN DRVVT: 27.7 SEC (ref 0–47)
THROMBIN TIME: 18 SEC (ref 0–23)

## 2018-09-11 LAB
PROT C AG ACT/NOR PPP IA: 133 % (ref 60–150)
PROT S AG ACT/NOR PPP IA: 74 % (ref 60–150)
PROT S FREE AG ACT/NOR PPP IA: 69 % (ref 57–157)

## 2018-09-16 DIAGNOSIS — E03.2 HYPOTHYROIDISM DUE TO MEDICATION: Primary | ICD-10-CM

## 2018-09-16 RX ORDER — LEVOTHYROXINE SODIUM 100 UG/1
TABLET ORAL
Qty: 30 TAB | Refills: 11 | Status: SHIPPED | OUTPATIENT
Start: 2018-09-16 | End: 2019-06-02

## 2018-09-18 ENCOUNTER — TELEPHONE (OUTPATIENT)
Dept: INTERNAL MEDICINE CLINIC | Age: 33
End: 2018-09-18

## 2018-10-08 ENCOUNTER — LAB ONLY (OUTPATIENT)
Dept: INTERNAL MEDICINE CLINIC | Age: 33
End: 2018-10-08

## 2018-10-08 DIAGNOSIS — E03.2 HYPOTHYROIDISM DUE TO MEDICATION: Primary | ICD-10-CM

## 2018-10-11 LAB — TSH SERPL DL<=0.005 MIU/L-ACNC: 1.69 UIU/ML (ref 0.45–4.5)

## 2019-02-04 ENCOUNTER — OFFICE VISIT (OUTPATIENT)
Dept: INTERNAL MEDICINE CLINIC | Age: 34
End: 2019-02-04

## 2019-02-04 VITALS
SYSTOLIC BLOOD PRESSURE: 115 MMHG | WEIGHT: 106.7 LBS | OXYGEN SATURATION: 99 % | HEART RATE: 72 BPM | BODY MASS INDEX: 15.28 KG/M2 | HEIGHT: 70 IN | DIASTOLIC BLOOD PRESSURE: 70 MMHG | TEMPERATURE: 99 F | RESPIRATION RATE: 16 BRPM

## 2019-02-04 DIAGNOSIS — E03.2 HYPOTHYROIDISM DUE TO MEDICATION: Primary | ICD-10-CM

## 2019-02-04 DIAGNOSIS — R53.83 FATIGUE, UNSPECIFIED TYPE: ICD-10-CM

## 2019-02-04 DIAGNOSIS — R07.9 CHEST PAIN, UNSPECIFIED TYPE: ICD-10-CM

## 2019-02-04 DIAGNOSIS — G44.219 EPISODIC TENSION-TYPE HEADACHE, NOT INTRACTABLE: ICD-10-CM

## 2019-02-04 NOTE — PROGRESS NOTES
Chief Complaint Patient presents with  Chest Pain 6 month follow up; patient states that she is still having chest pains accompanied with headaches. 1. Have you been to the ER, urgent care clinic since your last visit? Hospitalized since your last visit? Yes When: about 2 to 3 months ago [facility] Reason for visit: chest pain 2. Have you seen or consulted any other health care providers outside of the [address] [place] [address] since your last visit? Include any pap smears or colon screening.  No

## 2019-02-05 LAB — TSH SERPL DL<=0.005 MIU/L-ACNC: 0.38 UIU/ML (ref 0.45–4.5)

## 2019-02-27 NOTE — PROGRESS NOTES
[address] [facility] 
[place] [address] 
[address] [place] [address] [address] Phone:  [phone]  Fax: [phone] Chief Complaint Patient presents with  Chest Pain 6 month follow up; patient states that she is still having chest pains accompanied with headaches. .   
 
SUBJECTIVE: 
  Emil Santana is a 29 y.o. female Comes in for return visit stating that she is generally doing well except for several problems. Complains of vague headaches. They are circumferential and are not associated with any other symptoms. They are episodic and have only been present for the last 1-2 weeks. She states that she is not under any more stress than usual. 
 
She also has vague chest pain, generally right sided, which is intermittent. It is not associated with any other symptoms. Movement of the chest aggravates the discomfort. She complains of fatigue. Finally, she has been taking her thyroid supplement as prescribed and is concerned whether or not she has remained euthyroid. Current Outpatient Medications Medication Sig Dispense Refill  levothyroxine (SYNTHROID) 100 mcg tablet Take 1 tablet daily on empty stomach 30 Tab 11  
 PRENATAL VIT/IRON FUMARATE/FA (PRENATAL PO) Take  by mouth.  sucralfate (CARAFATE) 1 gram tablet Take 1 Tab by mouth four (4) times daily. 120 Tab 11  
 ibuprofen (MOTRIN) 800 mg tablet Take  by mouth.  ferrous sulfate 325 mg (65 mg iron) tablet Take one tab po daily 30 Tab 3  
 ondansetron (ZOFRAN ODT) 4 mg disintegrating tablet Take 1 Tab by mouth every eight (8) hours as needed for Nausea. 20 Tab 1  ASPIRIN/ACETAMINOPHEN/CAFFEINE (MIGRAINE PO) Take  by mouth.  ERGOCALCIFEROL, VITAMIN D2, (VITAMIN D2 PO) Take  by mouth. Past Medical History:  
Diagnosis Date  Arthritis  Scoliosis  Sickle cell trait ([facility] [address]  Thyroid disease Past Surgical History:  
Procedure Laterality Date  HX GYN    
  HX HERNIA REPAIR  3/22/16 Open umbilical hernia repair Allergies Allergen Reactions  Egg Nausea and Vomiting  Flexeril [Cyclobenzaprine] Nausea and Vomiting REVIEW OF SYSTEMS: 
General: negative for - chills or fever ENT: negative for - headaches, nasal congestion or tinnitus Respiratory: negative for - cough, hemoptysis, shortness of breath or wheezing Cardiovascular : negative for - chest pain, edema, palpitations or shortness of breath Gastrointestinal: negative for - abdominal pain, blood in stools, heartburn or nausea/vomiting Genito-Urinary: no dysuria, trouble voiding, or hematuria Musculoskeletal: negative for - gait disturbance, joint pain, joint stiffness or joint swelling Neurological: no TIA or stroke symptoms Hematologic: no bruises, no bleeding, no swollen glands Integument: no lumps, mole changes, nail changes or rash Endocrine: no malaise/lethargy or unexpected weight changes Social History Socioeconomic History  Marital status: SINGLE Spouse name: Not on file  Number of children: Not on file  Years of education: Not on file  Highest education level: Not on file Tobacco Use  Smoking status: Never Smoker  Smokeless tobacco: Never Used Substance and Sexual Activity  Alcohol use: No  
 Drug use: No  
 Sexual activity: Yes  
  Partners: Male Family History Problem Relation Age of Onset AbielTaya Arthritis-osteo Mother  Hypertension Mother  Kidney Disease Mother  Kidney Disease Father  Asthma Father  Diabetes Maternal Aunt  Asthma Paternal Aunt  Diabetes Paternal Aunt  Cancer Paternal Grandmother  Asthma Sister  Cancer Maternal Uncle   
     liver cancer  Heart Attack Maternal Uncle OBJECTIVE: 
 
Visit Vitals /70 Pulse 72 Temp 99 °F (37.2 °C) (Oral) Resp 16 Ht 5' 10\" (1.778 m) Wt 106 lb 11.2 oz (48.4 kg) SpO2 99% BMI 15.31 kg/m² CONSTITUTIONAL: well , well nourished, appears age appropriate EYES: perrla, eom intact ENMT:moist mucous membranes, pharynx clear NECK: supple. Thyroid normal 
RESPIRATORY: Chest: clear to ascultation and percussion CARDIOVASCULAR: Heart: regular rate and rhythm GASTROINTESTINAL: Abdomen: soft, bowel sounds active HEMATOLOGIC: no pathological lymph nodes palpated MUSCULOSKELETAL: Extremities: no edema, pulse 1+ INTEGUMENT: No unusual rashes or suspicious skin lesions noted. Nails appear normal. 
NEUROLOGIC: non-focal exam  
MENTAL STATUS: alert and oriented, appropriate affect ASSESSMENT: 
1. Hypothyroidism due to medication 2. Episodic tension-type headache, not intractable 3. Chest pain, unspecified type 4. Fatigue, unspecified type PLAN: 
 
1. TSH will be checked to ensure that she is indeed euthyroid. 2. As far as the headaches are concerned, I suspect these are simple muscle contraction headaches and they should resolve. If no improvement occurs, I will refer her to a neurologist. 
3. She has chest wall pain. Symptomatic treatment for now. 4. As far as her fatigue is concerned, I think it is the rigors of her job and taking care of her new child. Historically all of her labs have been essentially normal. Observation for now. . 
Orders Placed This Encounter  TSH 3RD GENERATION Follow-up Disposition: 
Return in about 4 months (around 6/4/2019).  
 
 
Leoncio Boogie MD

## 2019-05-21 ENCOUNTER — OFFICE VISIT (OUTPATIENT)
Dept: INTERNAL MEDICINE CLINIC | Age: 34
End: 2019-05-21

## 2019-05-21 VITALS
HEART RATE: 83 BPM | DIASTOLIC BLOOD PRESSURE: 72 MMHG | TEMPERATURE: 98.3 F | SYSTOLIC BLOOD PRESSURE: 116 MMHG | HEIGHT: 70 IN | RESPIRATION RATE: 16 BRPM | WEIGHT: 109.4 LBS | BODY MASS INDEX: 15.66 KG/M2 | OXYGEN SATURATION: 99 %

## 2019-05-21 DIAGNOSIS — D64.9 ANEMIA, UNSPECIFIED TYPE: ICD-10-CM

## 2019-05-21 DIAGNOSIS — M41.9 SCOLIOSIS OF THORACOLUMBAR SPINE, UNSPECIFIED SCOLIOSIS TYPE: ICD-10-CM

## 2019-05-21 DIAGNOSIS — E03.2 HYPOTHYROIDISM DUE TO MEDICATION: ICD-10-CM

## 2019-05-21 DIAGNOSIS — Z00.00 PHYSICAL EXAM: ICD-10-CM

## 2019-05-21 DIAGNOSIS — M94.0 COSTOCHONDRITIS, ACUTE: Primary | ICD-10-CM

## 2019-05-21 NOTE — PROGRESS NOTES
Chief Complaint   Patient presents with    Rib Pain     left side      1. Have you been to the ER, urgent care clinic since your last visit? Hospitalized since your last visit? Yes When: pt does not remember the date Reason for visit: chest pain    2. Have you seen or consulted any other health care providers outside of the [address] [place] [address] since your last visit? Include any pap smears or colon screening.  Yes Where: [facility]

## 2019-05-21 NOTE — PROGRESS NOTES
[address] [place] [address] and Primary Care  [place] [address]  [address] [facility] [address]  Phone:  [phone]  Fax: [phone]       Chief Complaint   Patient presents with    Rib Pain     left side    . SUBJECTIVE:    Jose Holbrook is a 29 y.o. female Comes in for return visit complaining of chest pain. This has been intermittent. It is left sided and comes in gradually and abates gradually. She went to the ER, where initial workup was negative. She continues to have the sensation and it generally lasts for hours. There are no precipitating or exacerbating factors. She denies similar symptoms previously. The discomfort has no pleuritic component. She has a history of hypothyroidism and is doing quite well from that perspective based on her last TSH. Current Outpatient Medications   Medication Sig Dispense Refill    levothyroxine (SYNTHROID) 100 mcg tablet Take 1 tablet daily on empty stomach 30 Tab 11    ferrous sulfate 325 mg (65 mg iron) tablet Take one tab po daily 30 Tab 3    PRENATAL VIT/IRON FUMARATE/FA (PRENATAL PO) Take  by mouth.  ERGOCALCIFEROL, VITAMIN D2, (VITAMIN D2 PO) Take  by mouth.  sucralfate (CARAFATE) 1 gram tablet Take 1 Tab by mouth four (4) times daily. 120 Tab 11    ibuprofen (MOTRIN) 800 mg tablet Take  by mouth.  ondansetron (ZOFRAN ODT) 4 mg disintegrating tablet Take 1 Tab by mouth every eight (8) hours as needed for Nausea. 20 Tab 1    ASPIRIN/ACETAMINOPHEN/CAFFEINE (MIGRAINE PO) Take  by mouth.        Past Medical History:   Diagnosis Date    Arthritis     Scoliosis     Sickle cell trait ([address]     Thyroid disease      Past Surgical History:   Procedure Laterality Date    HX GYN      HX HERNIA REPAIR  7/68/78    Open umbilical hernia repair     Allergies   Allergen Reactions    Egg Nausea and Vomiting    Flexeril [Cyclobenzaprine] Nausea and Vomiting         REVIEW OF SYSTEMS:  General: negative for - chills or fever  ENT: negative for - headaches, nasal congestion or tinnitus  Respiratory: negative for - cough, hemoptysis, shortness of breath or wheezing  Cardiovascular : negative for - chest pain, edema, palpitations or shortness of breath  Gastrointestinal: negative for - abdominal pain, blood in stools, heartburn or nausea/vomiting  Genito-Urinary: no dysuria, trouble voiding, or hematuria  Musculoskeletal: negative for - gait disturbance, joint pain, joint stiffness or joint swelling  Neurological: no TIA or stroke symptoms  Hematologic: no bruises, no bleeding, no swollen glands  Integument: no lumps, mole changes, nail changes or rash  Endocrine: no malaise/lethargy or unexpected weight changes      Social History     Socioeconomic History    Marital status: SINGLE     Spouse name: Not on file    Number of children: Not on file    Years of education: Not on file    Highest education level: Not on file   Tobacco Use    Smoking status: Never Smoker    Smokeless tobacco: Never Used   Substance and Sexual Activity    Alcohol use: No    Drug use: No    Sexual activity: Yes     Partners: Male     Family History   Problem Relation Age of Onset    Arthritis-osteo Mother     Hypertension Mother     Kidney Disease Mother     Kidney Disease Father     Asthma Father     Diabetes Maternal Aunt     Asthma Paternal Aunt     Diabetes Paternal Aunt     Cancer Paternal Grandmother     Asthma Sister     Cancer Maternal Uncle         liver cancer    Heart Attack Maternal Uncle        OBJECTIVE:    Visit Vitals  /72   Pulse 83   Temp 98.3 °F (36.8 °C) (Oral)   Resp 16   Ht 5' 10\" (1.778 m)   Wt 109 lb 6.4 oz (49.6 kg)   SpO2 99%   BMI 15.70 kg/m²     CONSTITUTIONAL: well , well nourished, appears age appropriate  EYES: perrla, eom intact  ENMT:moist mucous membranes, pharynx clear  NECK: supple.  Thyroid normal  RESPIRATORY: Chest: clear to ascultation and percussion   CARDIOVASCULAR: Heart: regular rate and rhythm  GASTROINTESTINAL: Abdomen: soft, bowel sounds active  HEMATOLOGIC: no pathological lymph nodes palpated  MUSCULOSKELETAL: Extremities: no edema, pulse 1+   INTEGUMENT: No unusual rashes or suspicious skin lesions noted. Nails appear normal.  NEUROLOGIC: non-focal exam   MENTAL STATUS: alert and oriented, appropriate affect      ASSESSMENT:  1. Costochondritis, acute    2. Hypothyroidism due to medication    3. Anemia, unspecified type    4. Scoliosis of thoracolumbar spine, unspecified scoliosis type    5. Physical exam        PLAN:    1. The patient appears to have a costochondritis. It is well circumscribed and located about the third and fourth left costochondral joints. There is consistent reproduction of the discomfort with palpation of these areas. Nothing more need be done. She can take NSAIDs if she like OTC. 2. Thyroid status appears to be stable, but I will await the results of her TSH from today. 3. She has a history of anemia and I will check a CBC. This would be presumably related to her menses. Follow-up and Dispositions    · Return in about 6 months (around 11/21/2019).            Janny Santoyo MD

## 2019-05-24 LAB
ALBUMIN SERPL-MCNC: 4.1 G/DL (ref 3.5–5.5)
ALBUMIN/GLOB SERPL: 1.4 {RATIO} (ref 1.2–2.2)
ALP SERPL-CCNC: 75 IU/L (ref 39–117)
ALT SERPL-CCNC: 227 IU/L (ref 0–32)
APPEARANCE UR: CLEAR
AST SERPL-CCNC: 180 IU/L (ref 0–40)
BACTERIA #/AREA URNS HPF: ABNORMAL /[HPF]
BASOPHILS # BLD AUTO: 0.1 X10E3/UL (ref 0–0.2)
BASOPHILS NFR BLD AUTO: 1 %
BILIRUB SERPL-MCNC: 0.8 MG/DL (ref 0–1.2)
BILIRUB UR QL STRIP: NEGATIVE
BUN SERPL-MCNC: 14 MG/DL (ref 6–20)
BUN/CREAT SERPL: 21 (ref 9–23)
CALCIUM SERPL-MCNC: 9.9 MG/DL (ref 8.7–10.2)
CASTS URNS QL MICRO: ABNORMAL /LPF
CHLORIDE SERPL-SCNC: 109 MMOL/L (ref 96–106)
CHOLEST SERPL-MCNC: 160 MG/DL (ref 100–199)
CO2 SERPL-SCNC: 22 MMOL/L (ref 20–29)
COLOR UR: YELLOW
CREAT SERPL-MCNC: 0.66 MG/DL (ref 0.57–1)
EOSINOPHIL # BLD AUTO: 0.6 X10E3/UL (ref 0–0.4)
EOSINOPHIL NFR BLD AUTO: 9 %
EPI CELLS #/AREA URNS HPF: ABNORMAL /HPF (ref 0–10)
ERYTHROCYTE [DISTWIDTH] IN BLOOD BY AUTOMATED COUNT: 15.1 % (ref 12.3–15.4)
GLOBULIN SER CALC-MCNC: 2.9 G/DL (ref 1.5–4.5)
GLUCOSE SERPL-MCNC: 63 MG/DL (ref 65–99)
GLUCOSE UR QL: NEGATIVE
HCT VFR BLD AUTO: 44.6 % (ref 34–46.6)
HDLC SERPL-MCNC: 82 MG/DL
HGB BLD-MCNC: 14.6 G/DL (ref 11.1–15.9)
HGB UR QL STRIP: ABNORMAL
IMM GRANULOCYTES # BLD AUTO: 0.1 X10E3/UL (ref 0–0.1)
IMM GRANULOCYTES NFR BLD AUTO: 2 %
KETONES UR QL STRIP: NEGATIVE
LDLC SERPL CALC-MCNC: 62 MG/DL (ref 0–99)
LEUKOCYTE ESTERASE UR QL STRIP: NEGATIVE
LYMPHOCYTES # BLD AUTO: 1.9 X10E3/UL (ref 0.7–3.1)
LYMPHOCYTES NFR BLD AUTO: 29 %
MCH RBC QN AUTO: 31.7 PG (ref 26.6–33)
MCHC RBC AUTO-ENTMCNC: 32.7 G/DL (ref 31.5–35.7)
MCV RBC AUTO: 97 FL (ref 79–97)
MICRO URNS: ABNORMAL
MONOCYTES # BLD AUTO: 0.9 X10E3/UL (ref 0.1–0.9)
MONOCYTES NFR BLD AUTO: 14 %
MUCOUS THREADS URNS QL MICRO: PRESENT
NEUTROPHILS # BLD AUTO: 3 X10E3/UL (ref 1.4–7)
NEUTROPHILS NFR BLD AUTO: 45 %
NITRITE UR QL STRIP: NEGATIVE
PH UR STRIP: 6.5 [PH] (ref 5–7.5)
PLATELET # BLD AUTO: 257 X10E3/UL (ref 150–450)
POTASSIUM SERPL-SCNC: 4.4 MMOL/L (ref 3.5–5.2)
PROT SERPL-MCNC: 7 G/DL (ref 6–8.5)
PROT UR QL STRIP: ABNORMAL
RBC # BLD AUTO: 4.6 X10E6/UL (ref 3.77–5.28)
RBC #/AREA URNS HPF: ABNORMAL /HPF (ref 0–2)
SODIUM SERPL-SCNC: 143 MMOL/L (ref 134–144)
SP GR UR: 1.01 (ref 1–1.03)
TRIGL SERPL-MCNC: 78 MG/DL (ref 0–149)
TSH SERPL DL<=0.005 MIU/L-ACNC: 0.03 UIU/ML (ref 0.45–4.5)
UROBILINOGEN UR STRIP-MCNC: 1 MG/DL (ref 0.2–1)
VLDLC SERPL CALC-MCNC: 16 MG/DL (ref 5–40)
WBC # BLD AUTO: 6.5 X10E3/UL (ref 3.4–10.8)
WBC #/AREA URNS HPF: ABNORMAL /HPF (ref 0–5)

## 2019-06-02 RX ORDER — LEVOTHYROXINE SODIUM 88 UG/1
TABLET ORAL
Qty: 30 TAB | Refills: 11 | Status: SHIPPED | OUTPATIENT
Start: 2019-06-02 | End: 2020-05-22 | Stop reason: SDUPTHER

## 2019-06-06 ENCOUNTER — TELEPHONE (OUTPATIENT)
Dept: INTERNAL MEDICINE CLINIC | Age: 34
End: 2019-06-06

## 2019-06-06 NOTE — TELEPHONE ENCOUNTER
Patient notified by phone and mail and ask to contact office regarding new dose of thyroid meds and increase that needs to be made in her insulin.

## 2019-06-10 NOTE — TELEPHONE ENCOUNTER
Patient notified by phone and ask to make sure she  new rx of levothyroxine 88mcg and to return to the office for a repeat hepatic and hepatitis a,b, c.

## 2019-06-27 ENCOUNTER — LAB ONLY (OUTPATIENT)
Dept: INTERNAL MEDICINE CLINIC | Age: 34
End: 2019-06-27

## 2019-06-27 DIAGNOSIS — R79.89 ELEVATED LFTS: Primary | ICD-10-CM

## 2019-06-28 LAB
ALBUMIN SERPL-MCNC: 4.2 G/DL (ref 3.5–5.5)
ALP SERPL-CCNC: 59 IU/L (ref 39–117)
ALT SERPL-CCNC: 54 IU/L (ref 0–32)
AST SERPL-CCNC: 47 IU/L (ref 0–40)
BILIRUB DIRECT SERPL-MCNC: 0.26 MG/DL (ref 0–0.4)
BILIRUB SERPL-MCNC: 0.8 MG/DL (ref 0–1.2)
HAV IGM SERPL QL IA: NEGATIVE
HBV CORE IGM SERPL QL IA: NEGATIVE
HBV SURFACE AG SERPL QL IA: NEGATIVE
HCV AB S/CO SERPL IA: <0.1 S/CO RATIO (ref 0–0.9)
PROT SERPL-MCNC: 7.1 G/DL (ref 6–8.5)

## 2019-09-06 ENCOUNTER — LAB ONLY (OUTPATIENT)
Dept: INTERNAL MEDICINE CLINIC | Age: 34
End: 2019-09-06

## 2019-09-06 DIAGNOSIS — E03.2 HYPOTHYROIDISM DUE TO MEDICATION: Primary | ICD-10-CM

## 2019-09-07 LAB — TSH SERPL DL<=0.005 MIU/L-ACNC: 1.44 UIU/ML (ref 0.45–4.5)

## 2019-11-25 ENCOUNTER — OFFICE VISIT (OUTPATIENT)
Dept: INTERNAL MEDICINE CLINIC | Age: 34
End: 2019-11-25

## 2019-11-25 VITALS
DIASTOLIC BLOOD PRESSURE: 78 MMHG | TEMPERATURE: 98.4 F | HEART RATE: 90 BPM | RESPIRATION RATE: 16 BRPM | WEIGHT: 108.6 LBS | BODY MASS INDEX: 15.55 KG/M2 | SYSTOLIC BLOOD PRESSURE: 115 MMHG | OXYGEN SATURATION: 97 % | HEIGHT: 70 IN

## 2019-11-25 DIAGNOSIS — Z90.722 STATUS POST BILATERAL OOPHORECTOMY: ICD-10-CM

## 2019-11-25 DIAGNOSIS — K21.00 REFLUX ESOPHAGITIS: ICD-10-CM

## 2019-11-25 DIAGNOSIS — R05.9 COUGH: ICD-10-CM

## 2019-11-25 DIAGNOSIS — N80.9 ENDOMETRIOSIS: Primary | ICD-10-CM

## 2019-11-25 DIAGNOSIS — E03.2 HYPOTHYROIDISM DUE TO MEDICATION: ICD-10-CM

## 2019-11-25 NOTE — PROGRESS NOTES
Chief Complaint   Patient presents with    Hypothyroidism     6 month follow up      1. Have you been to the ER, urgent care clinic since your last visit? Hospitalized since your last visit? Yes When: 11/21/19 [facility]: [facility]  Reason for visit: partial hysterectomy    2. Have you seen or consulted any other health care providers outside of the [address] [place] [address] since your last visit? Include any pap smears or colon screening.  No

## 2019-11-26 NOTE — PROGRESS NOTES
[address] [facility]  [place] [address]  [address] [place] [address]  Phone:  [phone]  Fax: [phone]       Chief Complaint   Patient presents with    Hypothyroidism     6 month follow up    . SUBJECTIVE:    Ashley Puente is a 29 y.o. female Comes in for return visit stating that she most recently had bilateral oophorectomy for endometriosis. She is recuperating from this. One of her complaints is that of a cough, which she had preoperatively. She went to the emergency room and it was suggested by the physician that she possibly had reflux esophagitis. She has been taking an H2 antagonist since then with no improvement. In reality, she is clearing her throat frequently. She is also wondering if the surgical procedure impacted her thyroid status. She is recuperating reasonably well at this point. Current Outpatient Medications   Medication Sig Dispense Refill    levothyroxine (SYNTHROID) 88 mcg tablet Take 1 tablet daily on empty stomach 30 Tab 11    sucralfate (CARAFATE) 1 gram tablet Take 1 Tab by mouth four (4) times daily. 120 Tab 11    ibuprofen (MOTRIN) 800 mg tablet Take  by mouth.  ferrous sulfate 325 mg (65 mg iron) tablet Take one tab po daily 30 Tab 3    ondansetron (ZOFRAN ODT) 4 mg disintegrating tablet Take 1 Tab by mouth every eight (8) hours as needed for Nausea. 20 Tab 1    PRENATAL VIT/IRON FUMARATE/FA (PRENATAL PO) Take  by mouth.  ASPIRIN/ACETAMINOPHEN/CAFFEINE (MIGRAINE PO) Take  by mouth.  ERGOCALCIFEROL, VITAMIN D2, (VITAMIN D2 PO) Take  by mouth.        Past Medical History:   Diagnosis Date    Arthritis     Scoliosis     Sickle cell trait ([address]     Thyroid disease      Past Surgical History:   Procedure Laterality Date    HX GYN      HX HERNIA REPAIR  9/32/98    Open umbilical hernia repair    HX PARTIAL HYSTERECTOMY  11/21/2019     Allergies   Allergen Reactions    Egg Nausea and Vomiting    Flexeril [Cyclobenzaprine] Nausea and Vomiting         REVIEW OF SYSTEMS:  General: negative for - chills or fever  ENT: negative for - headaches, nasal congestion or tinnitus  Respiratory: negative for - cough, hemoptysis, shortness of breath or wheezing  Cardiovascular : negative for - chest pain, edema, palpitations or shortness of breath  Gastrointestinal: negative for - abdominal pain, blood in stools, heartburn or nausea/vomiting  Genito-Urinary: no dysuria, trouble voiding, or hematuria  Musculoskeletal: negative for - gait disturbance, joint pain, joint stiffness or joint swelling  Neurological: no TIA or stroke symptoms  Hematologic: no bruises, no bleeding, no swollen glands  Integument: no lumps, mole changes, nail changes or rash  Endocrine: no malaise/lethargy or unexpected weight changes      Social History     Socioeconomic History    Marital status: SINGLE     Spouse name: Not on file    Number of children: Not on file    Years of education: Not on file    Highest education level: Not on file   Tobacco Use    Smoking status: Never Smoker    Smokeless tobacco: Never Used   Substance and Sexual Activity    Alcohol use: No    Drug use: No    Sexual activity: Yes     Partners: Male     Family History   Problem Relation Age of Onset    Arthritis-osteo Mother     Hypertension Mother     Kidney Disease Mother     Kidney Disease Father     Asthma Father     Diabetes Maternal Aunt     Asthma Paternal Aunt     Diabetes Paternal Aunt     Cancer Paternal Grandmother     Asthma Sister     Cancer Maternal Uncle         liver cancer    Heart Attack Maternal Uncle        OBJECTIVE:    Visit Vitals  /78   Pulse 90   Temp 98.4 °F (36.9 °C) (Oral)   Resp 16   Ht 5' 10\" (1.778 m)   Wt 108 lb 9.6 oz (49.3 kg)   LMP 11/21/2019   SpO2 97%   BMI 15.58 kg/m²     CONSTITUTIONAL: well , well nourished, appears age appropriate  EYES: perrla, eom intact  ENMT:moist mucous membranes, pharynx clear  NECK: supple. Thyroid normal  RESPIRATORY: Chest: clear to ascultation and percussion   CARDIOVASCULAR: Heart: regular rate and rhythm  GASTROINTESTINAL: Abdomen: soft, bowel sounds active  HEMATOLOGIC: no pathological lymph nodes palpated  MUSCULOSKELETAL: Extremities: no edema, pulse 1+   INTEGUMENT: No unusual rashes or suspicious skin lesions noted. Nails appear normal.  NEUROLOGIC: non-focal exam   MENTAL STATUS: alert and oriented, appropriate affect      ASSESSMENT:  1. Endometriosis    2. Status post bilateral oophorectomy    3. Hypothyroidism due to medication    4. Cough    5. Reflux esophagitis        PLAN:    1. Hopefully the endometriosis will resolve with the oophorectomy. Unfortunately, she has significant vasomotor instability. This will be worked out with her gynecologist.  2. I explained to the patient that at this point she does not really need to have a TSH checked because surgical procedure will not impact this. 3. The cough is really one of the things that bothers her. She did not give me any suggestive symptoms that she had reflux. I suspect it is probably related to a postnasal drip from rhinitis. She will, however, continue Pepcid at 40 mg daily for the next three weeks and I will see her back at that time. If her symptoms are better then she will continue it; if not, she will be placed on Claritin-D 12. My working diagnosis for the cough is a rhinitis. Follow-up and Dispositions    · Return in about 3 weeks (around 12/16/2019).            Erwin Espinal MD

## 2019-12-17 ENCOUNTER — OFFICE VISIT (OUTPATIENT)
Dept: INTERNAL MEDICINE CLINIC | Age: 34
End: 2019-12-17

## 2019-12-17 VITALS
OXYGEN SATURATION: 98 % | RESPIRATION RATE: 16 BRPM | BODY MASS INDEX: 15.75 KG/M2 | HEIGHT: 70 IN | DIASTOLIC BLOOD PRESSURE: 82 MMHG | TEMPERATURE: 98.4 F | SYSTOLIC BLOOD PRESSURE: 119 MMHG | HEART RATE: 70 BPM | WEIGHT: 110 LBS

## 2019-12-17 DIAGNOSIS — E03.2 HYPOTHYROIDISM DUE TO MEDICATION: Primary | ICD-10-CM

## 2019-12-17 DIAGNOSIS — R79.89 ELEVATED LFTS: ICD-10-CM

## 2019-12-17 NOTE — PROGRESS NOTES
Chief Complaint   Patient presents with    Hypothyroidism     3 week follow up     1. Have you been to the ER, urgent care clinic since your last visit? Hospitalized since your last visit? Yes When: about 4 weeks ago [facility] Reason for visit: post surgerical issue    2. Have you seen or consulted any other health care providers outside of the [address] [place] [address] since your last visit? Include any pap smears or colon screening.  No

## 2019-12-18 LAB
ALBUMIN SERPL-MCNC: 4.9 G/DL (ref 3.5–5.5)
ALP SERPL-CCNC: 65 IU/L (ref 39–117)
ALT SERPL-CCNC: 14 IU/L (ref 0–32)
AST SERPL-CCNC: 21 IU/L (ref 0–40)
BILIRUB DIRECT SERPL-MCNC: 0.17 MG/DL (ref 0–0.4)
BILIRUB SERPL-MCNC: 0.7 MG/DL (ref 0–1.2)
HAV IGM SERPL QL IA: NEGATIVE
HBV CORE IGM SERPL QL IA: NEGATIVE
HBV SURFACE AG SERPL QL IA: NEGATIVE
HCV AB S/CO SERPL IA: <0.1 S/CO RATIO (ref 0–0.9)
PROT SERPL-MCNC: 7.7 G/DL (ref 6–8.5)
TSH SERPL DL<=0.005 MIU/L-ACNC: 1.22 UIU/ML (ref 0.45–4.5)

## 2019-12-18 NOTE — PROGRESS NOTES
Chief Complaint   Patient presents with    Hypothyroidism     3 week follow up   . SUBECTIVE:    Mike Jenkins is a 29 y.o. female Comes in for return visit stating that her cough is significantly better. Presumably this was related to reflux esophagitis. She remains on her antacid, which is ______________ for now. She wishes to have her thyroid checked. Finally, she had elevation of LFTs and I am not entirely sure of the etiology of this. This needs to be followed up. Current Outpatient Medications   Medication Sig Dispense Refill    levothyroxine (SYNTHROID) 88 mcg tablet Take 1 tablet daily on empty stomach 30 Tab 11    sucralfate (CARAFATE) 1 gram tablet Take 1 Tab by mouth four (4) times daily. 120 Tab 11    ibuprofen (MOTRIN) 800 mg tablet Take  by mouth.  ferrous sulfate 325 mg (65 mg iron) tablet Take one tab po daily 30 Tab 3    ondansetron (ZOFRAN ODT) 4 mg disintegrating tablet Take 1 Tab by mouth every eight (8) hours as needed for Nausea. 20 Tab 1    PRENATAL VIT/IRON FUMARATE/FA (PRENATAL PO) Take  by mouth.  ASPIRIN/ACETAMINOPHEN/CAFFEINE (MIGRAINE PO) Take  by mouth.  ERGOCALCIFEROL, VITAMIN D2, (VITAMIN D2 PO) Take  by mouth.        Past Medical History:   Diagnosis Date    Arthritis     Scoliosis     Sickle cell trait ([facility] [address]     Thyroid disease      Past Surgical History:   Procedure Laterality Date    HX GYN      HX HERNIA REPAIR  8/29/92    Open umbilical hernia repair    HX PARTIAL HYSTERECTOMY  11/21/2019     Allergies   Allergen Reactions    Egg Nausea and Vomiting    Flexeril [Cyclobenzaprine] Nausea and Vomiting       REVIEW OF SYSTEMS:  Review of Systems - Negative except   ENT ROS: negative for - headaches, hearing change, nasal congestion, oral lesions, tinnitus, visual changes or   Respiratory ROS: no cough, shortness of breath, or wheezing  Cardiovascular ROS: no chest pain or dyspnea on exertion  Gastrointestinal ROS: no abdominal pain, change in bowel habits, or black or blood  Genito-Urinary ROS: no dysuria, trouble voiding, or hematuria  Musculoskeletal ROS: negative  Neurological ROS: no TIA or stroke symptoms      Social History     Socioeconomic History    Marital status: SINGLE     Spouse name: Not on file    Number of children: 1    Years of education: Not on file    Highest education level: Not on file   Occupational History    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   Tobacco Use    Smoking status: Never Smoker    Smokeless tobacco: Never Used   Substance and Sexual Activity    Alcohol use: No    Drug use: No    Sexual activity: Yes     Partners: Male   r  Family History   Problem Relation Age of Onset    Arthritis-osteo Mother     Hypertension Mother     Kidney Disease Mother     Kidney Disease Father     Asthma Father     Diabetes Maternal Aunt     Asthma Paternal Aunt     Diabetes Paternal Aunt     Cancer Paternal Grandmother     Asthma Sister     Cancer Maternal Uncle         liver cancer    Heart Attack Maternal Uncle        OBJECTIVE:  Visit Vitals  /82   Pulse 70   Temp 98.4 °F (36.9 °C) (Oral)   Resp 16   Ht 5' 10\" (1.778 m)   Wt 110 lb (49.9 kg)   LMP 11/21/2019   SpO2 98%   BMI 15.78 kg/m²     ENT: perrla,  eom intact  NECK: supple. Thyroid normal, no JVD  CHEST: clear to ascultation and percussion   HEART: regular rate and rhythm  ABD: soft, bowel sounds active,   EXTREMITIES: no edema, pulse 1+  INTEGUMENT: clear      ASSESSMENT:  1. Hypothyroidism due to medication    2. Elevated LFTs        PLAN:    1. Cough has resolved. She will continue H2 antagonist.  2. From a thyroid perspective, TSH will be assessed for efficacy and compliance. 3. For elevated LFTs, I will check hepatic profile and hepatitis profile. .  Orders Placed This Encounter    HEPATIC FUNCTION PANEL    TSH 3RD GENERATION    HEPATITIS PANEL, ACUTE       Follow-up and Dispositions    · Return in about 6 months (around 6/17/2020). Vlaeria Longoria MD

## 2020-04-20 ENCOUNTER — VIRTUAL VISIT (OUTPATIENT)
Dept: INTERNAL MEDICINE CLINIC | Age: 35
End: 2020-04-20

## 2020-04-20 DIAGNOSIS — F41.9 ANXIETY: ICD-10-CM

## 2020-04-20 DIAGNOSIS — E03.2 HYPOTHYROIDISM DUE TO MEDICATION: Primary | ICD-10-CM

## 2020-04-20 NOTE — PROGRESS NOTES
Shakir Poe is a 28 y.o. female evaluated via telephone on 4/20/2020. Consent:  She and/or health care decision maker is aware that that she may receive a bill for this telephone service, depending on her insurance coverage, and has provided verbal consent to proceed: Yes      Documentation:  1. For the last week the patient has noted increasing fatigue jitteriness bulb originally she thinks it might be her thyroid that is flaring up. Mind her that her thyroid gland will never become overactive again because it has been destroyed with radioactive iodine. She is quite aware of this. She will return to the office this week for a TSH. Just to her that it may be the current crises as well as the fact that she is no longer working may be creating the anxiety. I affirm this is a Patient Initiated Episode with an Established Patient who has not had a related appointment within my department in the past 7 days or scheduled within the next 24 hours.     Total Time: minutes: 5-10 minutes    Note: not billable if this call serves to triage the patient into an appointment for the relevant concern      Pablo More MD

## 2020-04-20 NOTE — PROGRESS NOTES
Chief Complaint   Patient presents with    Thyroid Problem     Patient states she has been experiencing fatigue, jittery, and abdominal pain. 1. Have you been to the ER, urgent care clinic since your last visit? Hospitalized since your last visit? No    2. Have you seen or consulted any other health care providers outside of the [address] [place] [address] since your last visit? Include any pap smears or colon screening.  No

## 2020-05-22 DIAGNOSIS — E03.2 HYPOTHYROIDISM DUE TO MEDICATION: Primary | ICD-10-CM

## 2020-05-22 RX ORDER — LEVOTHYROXINE SODIUM 88 UG/1
TABLET ORAL
Qty: 30 TAB | Refills: 11 | Status: SHIPPED | OUTPATIENT
Start: 2020-05-22 | End: 2022-07-01 | Stop reason: SDUPTHER

## 2020-05-25 ENCOUNTER — OFFICE VISIT (OUTPATIENT)
Dept: PRIMARY CARE CLINIC | Age: 35
End: 2020-05-25

## 2020-05-25 VITALS — OXYGEN SATURATION: 98 % | HEART RATE: 89 BPM | TEMPERATURE: 98.6 F

## 2020-05-25 DIAGNOSIS — Z01.811 PRE-OP CHEST EXAM: ICD-10-CM

## 2020-05-25 DIAGNOSIS — Z12.11 COLON CANCER SCREENING: ICD-10-CM

## 2020-05-25 DIAGNOSIS — Z01.818 PRE-OP TESTING: ICD-10-CM

## 2020-05-25 DIAGNOSIS — N80.9 ENDOMETRIOSIS: Primary | ICD-10-CM

## 2020-05-25 NOTE — PROGRESS NOTES
Pt has come in for preop testing has a colonoscopy on 5/28/20,- for which COVID testing was done. Pt has hysterectomy on 6/19/20--- advised pt she will need another covid testing done prior to that procedure.

## 2020-05-27 LAB — SARS-COV-2, NAA: NOT DETECTED

## 2020-05-28 ENCOUNTER — HOSPITAL ENCOUNTER (OUTPATIENT)
Age: 35
Setting detail: OUTPATIENT SURGERY
Discharge: HOME | End: 2020-05-28
Attending: INTERNAL MEDICINE | Admitting: INTERNAL MEDICINE
Payer: COMMERCIAL

## 2020-05-28 ENCOUNTER — ANESTHESIA (OUTPATIENT)
Dept: ENDOSCOPY | Age: 35
End: 2020-05-28
Payer: COMMERCIAL

## 2020-05-28 ENCOUNTER — ANESTHESIA EVENT (OUTPATIENT)
Dept: ENDOSCOPY | Age: 35
End: 2020-05-28
Payer: COMMERCIAL

## 2020-05-28 VITALS
DIASTOLIC BLOOD PRESSURE: 74 MMHG | HEIGHT: 70 IN | TEMPERATURE: 98.4 F | SYSTOLIC BLOOD PRESSURE: 122 MMHG | OXYGEN SATURATION: 100 % | HEART RATE: 70 BPM | WEIGHT: 117 LBS | RESPIRATION RATE: 18 BRPM | BODY MASS INDEX: 16.75 KG/M2

## 2020-05-28 LAB — HCG UR QL: NEGATIVE

## 2020-05-28 PROCEDURE — 76040000019: Performed by: INTERNAL MEDICINE

## 2020-05-28 PROCEDURE — 81025 URINE PREGNANCY TEST: CPT

## 2020-05-28 PROCEDURE — 74011250636 HC RX REV CODE- 250/636: Performed by: NURSE ANESTHETIST, CERTIFIED REGISTERED

## 2020-05-28 PROCEDURE — 74011000250 HC RX REV CODE- 250: Performed by: NURSE ANESTHETIST, CERTIFIED REGISTERED

## 2020-05-28 PROCEDURE — 76060000031 HC ANESTHESIA FIRST 0.5 HR: Performed by: INTERNAL MEDICINE

## 2020-05-28 RX ORDER — NALOXONE HYDROCHLORIDE 0.4 MG/ML
0.4 INJECTION, SOLUTION INTRAMUSCULAR; INTRAVENOUS; SUBCUTANEOUS
Status: DISCONTINUED | OUTPATIENT
Start: 2020-05-28 | End: 2020-05-28 | Stop reason: HOSPADM

## 2020-05-28 RX ORDER — SODIUM CHLORIDE 0.9 % (FLUSH) 0.9 %
5-40 SYRINGE (ML) INJECTION AS NEEDED
Status: DISCONTINUED | OUTPATIENT
Start: 2020-05-28 | End: 2020-05-28 | Stop reason: HOSPADM

## 2020-05-28 RX ORDER — FLUMAZENIL 0.1 MG/ML
0.2 INJECTION INTRAVENOUS
Status: DISCONTINUED | OUTPATIENT
Start: 2020-05-28 | End: 2020-05-28 | Stop reason: HOSPADM

## 2020-05-28 RX ORDER — PROPOFOL 10 MG/ML
INJECTION, EMULSION INTRAVENOUS AS NEEDED
Status: DISCONTINUED | OUTPATIENT
Start: 2020-05-28 | End: 2020-05-28 | Stop reason: HOSPADM

## 2020-05-28 RX ORDER — DEXTROMETHORPHAN/PSEUDOEPHED 2.5-7.5/.8
1.2 DROPS ORAL
Status: DISCONTINUED | OUTPATIENT
Start: 2020-05-28 | End: 2020-05-28 | Stop reason: HOSPADM

## 2020-05-28 RX ORDER — SODIUM CHLORIDE 0.9 % (FLUSH) 0.9 %
5-40 SYRINGE (ML) INJECTION EVERY 8 HOURS
Status: DISCONTINUED | OUTPATIENT
Start: 2020-05-28 | End: 2020-05-28 | Stop reason: HOSPADM

## 2020-05-28 RX ORDER — LIDOCAINE HYDROCHLORIDE 20 MG/ML
INJECTION, SOLUTION EPIDURAL; INFILTRATION; INTRACAUDAL; PERINEURAL AS NEEDED
Status: DISCONTINUED | OUTPATIENT
Start: 2020-05-28 | End: 2020-05-28 | Stop reason: HOSPADM

## 2020-05-28 RX ORDER — SODIUM CHLORIDE 9 MG/ML
INJECTION, SOLUTION INTRAVENOUS
Status: DISCONTINUED | OUTPATIENT
Start: 2020-05-28 | End: 2020-05-28 | Stop reason: HOSPADM

## 2020-05-28 RX ORDER — EPINEPHRINE 0.1 MG/ML
1 INJECTION INTRACARDIAC; INTRAVENOUS
Status: DISCONTINUED | OUTPATIENT
Start: 2020-05-28 | End: 2020-05-28 | Stop reason: HOSPADM

## 2020-05-28 RX ORDER — SODIUM CHLORIDE 9 MG/ML
50 INJECTION, SOLUTION INTRAVENOUS CONTINUOUS
Status: DISCONTINUED | OUTPATIENT
Start: 2020-05-28 | End: 2020-05-28 | Stop reason: HOSPADM

## 2020-05-28 RX ORDER — ATROPINE SULFATE 0.1 MG/ML
0.5 INJECTION INTRAVENOUS
Status: DISCONTINUED | OUTPATIENT
Start: 2020-05-28 | End: 2020-05-28 | Stop reason: HOSPADM

## 2020-05-28 RX ADMIN — PROPOFOL 30 MG: 10 INJECTION, EMULSION INTRAVENOUS at 08:49

## 2020-05-28 RX ADMIN — PROPOFOL 30 MG: 10 INJECTION, EMULSION INTRAVENOUS at 08:48

## 2020-05-28 RX ADMIN — PROPOFOL 50 MG: 10 INJECTION, EMULSION INTRAVENOUS at 08:43

## 2020-05-28 RX ADMIN — PROPOFOL 30 MG: 10 INJECTION, EMULSION INTRAVENOUS at 08:53

## 2020-05-28 RX ADMIN — PROPOFOL 30 MG: 10 INJECTION, EMULSION INTRAVENOUS at 08:46

## 2020-05-28 RX ADMIN — PROPOFOL 20 MG: 10 INJECTION, EMULSION INTRAVENOUS at 08:50

## 2020-05-28 RX ADMIN — SODIUM CHLORIDE: 900 INJECTION, SOLUTION INTRAVENOUS at 08:35

## 2020-05-28 RX ADMIN — PROPOFOL 50 MG: 10 INJECTION, EMULSION INTRAVENOUS at 08:44

## 2020-05-28 RX ADMIN — PROPOFOL 30 MG: 10 INJECTION, EMULSION INTRAVENOUS at 08:55

## 2020-05-28 RX ADMIN — PROPOFOL 50 MG: 10 INJECTION, EMULSION INTRAVENOUS at 08:45

## 2020-05-28 RX ADMIN — LIDOCAINE HYDROCHLORIDE 60 MG: 20 INJECTION, SOLUTION EPIDURAL; INFILTRATION; INTRACAUDAL; PERINEURAL at 08:46

## 2020-05-28 NOTE — PROGRESS NOTES
[phone] (home)   Attempted to call patient no answer left message to call me back in regards to her Covid testing results

## 2020-05-28 NOTE — ROUTINE PROCESS
Minna Rosebernardo 1985 
[id number] Situation: 
Verbal report received from: Berry Patel at [phone] Procedure: Procedure(s): 
COLONOSCOPY:- 
 
Background: 
 
Preoperative diagnosis: ABDOMINAL PAIN Postoperative diagnosis: Normal Colon :  Dr. Chris Cox Assistant(s): Endoscopy Technician-1: Teresita Santa 
Endoscopy RN-1: Mercedes Feliz RN Specimens: H. Pylori  no Assessment: 
Intra-procedure medications Anesthesia gave intra-procedure sedation and medications, see anesthesia flow sheet Intravenous fluids: NS@ Miachel Feast Vital signs stable Abdominal assessment: round and soft Recommendation: 
Discharge patient per MD order Family -yes Permission to share finding with family - yes

## 2020-05-28 NOTE — PROCEDURES
Tanika 64  174 [facility], [address] [place] [address]              :  Darius Whitfield MD    Surgical Assistant: None    Implants: None    Referring Provider: Cindy Henriquez MD    Sedation:  MAC anesthesia Propofol        Prior to the procedure its objectives, risks, consequences and alternatives were discussed with the patient who then elected to proceed. The patient had the opportunity to ask questions and those questions were answered. A physical exam was performed. The heart, lungs, and mental status were examined prior to the procedure and found to be satisfactory for conscious sedation and for the procedure. Conscious sedation was initiated by the physician. Continuous pulse oximetry and blood pressure monitoring were used throughout the procedure. After appropriate analgesia and rectal exam, the colonoscope was passed into the anus and passed to the cecum without difficulty. The prep was good. On slow withdrawal of the scope, the cecum, ascending colon, hepatic flexure, transverse colon, splenic flexure, descending colon, sigmoid and rectum were normal. Retroflexion exam of the rectum was normal. She tolerated the procedure without complication and I recommend a repeat colonoscopy in 15 years. Specimen none    Complications: None. EBL:  None.     Darius Whitfield MD  5/28/2020  9:03 AM

## 2020-05-28 NOTE — ANESTHESIA POSTPROCEDURE EVALUATION
Post-Anesthesia Evaluation and Assessment    Patient: Isa Lind MRN: [id number]  SSN: [id number]    YOB: 1985  Age: 28 y.o. Sex: female      I have evaluated the patient and they are stable and ready for discharge from the PACU. Cardiovascular Function/Vital Signs  Visit Vitals  /84   Pulse 78   Temp 37.2 °C (99 °F)   Resp 18   Ht 5' 10\" (1.778 m)   Wt 53.1 kg (117 lb)   SpO2 100%   Breastfeeding No   BMI 16.79 kg/m²       Patient is status post MAC anesthesia for Procedure(s):  COLONOSCOPY:-.    Nausea/Vomiting: None    Postoperative hydration reviewed and adequate. Pain:  Pain Scale 1: Numeric (0 - 10) (05/28/20 0750)  Pain Intensity 1: 7 (05/28/20 0750)   Managed    Neurological Status: At baseline    Mental Status, Level of Consciousness: Alert and  oriented to person, place, and time    Pulmonary Status:   O2 Device: Room air (05/28/20 0810)   Adequate oxygenation and airway patent    Complications related to anesthesia: None    Post-anesthesia assessment completed. No concerns    Signed By: Elaina Rutledge MD     May 28, 2020              Procedure(s):  COLONOSCOPY:-. MAC    <[email or web address]>    INITIAL Post-op Vital signs:   Vitals Value Taken Time   /79 5/28/2020  9:16 AM   Temp     Pulse 66 5/28/2020  9:17 AM   Resp 16 5/28/2020  9:17 AM   SpO2 100 % 5/28/2020  9:17 AM   Vitals shown include unvalidated device data.

## 2020-05-28 NOTE — ANESTHESIA PREPROCEDURE EVALUATION
Relevant Problems   No relevant active problems       Anesthetic History   No history of anesthetic complications            Review of Systems / Medical History  Patient summary reviewed, nursing notes reviewed and pertinent labs reviewed    Pulmonary  Within defined limits                 Neuro/Psych   Within defined limits           Cardiovascular  Within defined limits                Exercise tolerance: >4 METS  Comments: Nl echo 2018   GI/Hepatic/Renal  Within defined limits             Comments: abd pain Endo/Other      Hypothyroidism: well controlled  Arthritis     Other Findings   Comments: SS trait           Physical Exam    Airway  Mallampati: II  TM Distance: > 6 cm  Neck ROM: normal range of motion   Mouth opening: Normal     Cardiovascular  Regular rate and rhythm,  S1 and S2 normal,  no murmur, click, rub, or gallop             Dental  No notable dental hx       Pulmonary  Breath sounds clear to auscultation               Abdominal  GI exam deferred       Other Findings            Anesthetic Plan    ASA: 2  Anesthesia type: MAC          Induction: Intravenous  Anesthetic plan and risks discussed with: Patient

## 2020-05-28 NOTE — H&P
Tanika 64  174 [facility], [address] [place] [address]                 Lieutenant Bolanos is a  28 y.o.  female who presents with lower abdominal pain. Colonoscopy is being done prior to scheduled hysterectomy .         Past Medical History:   Diagnosis Date    Arthritis     Ill-defined condition     heart murmur, since age of 15    Scoliosis     Sickle cell trait ([address]     Thyroid disease      Past Surgical History:   Procedure Laterality Date    HX GYN      HX HERNIA REPAIR  0/62/50    Open umbilical hernia repair    HX PARTIAL HYSTERECTOMY  11/21/2019     Allergies   Allergen Reactions    Egg Nausea and Vomiting    Flexeril [Cyclobenzaprine] Nausea and Vomiting     Current Facility-Administered Medications   Medication Dose Route Frequency Provider Last Rate Last Dose    0.9% sodium chloride infusion  50 mL/hr IntraVENous CONTINUOUS David Shannon MD        sodium chloride (NS) flush 5-40 mL  5-40 mL IntraVENous Q8H David Shannon MD        sodium chloride (NS) flush 5-40 mL  5-40 mL IntraVENous PRN David Shannon MD        naloxone [facility]) injection 0.4 mg  0.4 mg IntraVENous Multiple David Shannon MD        flumazeniL (ROMAZICON) 0.1 mg/mL injection 0.2 mg  0.2 mg IntraVENous Multiple David Shannon MD        simethicone (MYLICON) 87VA/9.1XE oral drops 80 mg  1.2 mL Oral Multiple David Shannon MD        atropine injection 0.5 mg  0.5 mg IntraVENous ONCE PRN David Shannon MD        EPINEPHrine (ADRENALIN) 0.1 mg/mL syringe 1 mg  1 mg Endoscopically ONCE PRN David Shannon MD         Facility-Administered Medications Ordered in Other Encounters   Medication Dose Route Frequency Provider Last Rate Last Dose    0.9% sodium chloride infusion   IntraVENous CONTINUOUS Taylor Mireles CRNA           Visit Vitals  /84   Pulse 78   Temp 99 °F (37.2 °C)   Resp 18   Ht 5' 10\" (1.778 m)   Wt 53.1 kg (117 lb)   SpO2 100%   Breastfeeding No BMI 16.79 kg/m²       The patient was counseled at length about the risks of chelly Covid-19 in the oli-operative and post-operative states including the recovery window of their procedure. The patient was made aware that chelly Covid-19 after a surgical procedure may worsen their prognosis for recovering from the virus and lend to a higher morbidity and or mortality risk. The patient was given the options of postponing their procedure. All of the risks, benefits, and alternatives were discussed. The patient does  wish to proceed with the procedure. PHYSICAL EXAM:  General: WD, WN. Alert, cooperative, no acute distress    HEENT: NC, Atraumatic. PERRLA, EOMI. Anicteric sclerae. Mallampati score 2  Lungs:  CTA Bilaterally. No Wheezing/Rhonchi/Rales. Heart:  Regular  rhythm,  No murmur (), No Rubs, No Gallops  Abdomen: Soft, Non distended, Non tender. +Bowel sounds, no HSM  Extremities: No c/c/e  Neurologic:  CN 2-12 gi, Alert and oriented X 3. No acute neurological distress   Psych:   Good insight. Not anxious nor agitated. Plan:   Endoscopic procedure with MAC.     Keven Churchill MD  5/28/2020  8:44 AM

## 2020-05-28 NOTE — PERIOP NOTES

## 2020-05-28 NOTE — DISCHARGE INSTRUCTIONS
Tanika 64  174 [facility], [address] [place] [address] Alyse           Mojgan Merlos  [id number]  1985    COLON DISCHARGE INSTRUCTIONS    DISCOMFORT:  Redness at IV site- apply warm compress to area; if redness or soreness persist- contact your physician  There may be a slight amount of blood passed from the rectum  Gaseous discomfort- walking, belching will help relieve any discomfort  You may not operate a vehicle for 12 hours  You may not engage in an occupation involving machinery or appliances for rest of today  You may not drink alcoholic beverages for at least 12 hours  Avoid making any critical decisions for at least 24 hour  DIET:   Regular diet. - however -  remember your colon is empty and a heavy meal will produce gas. Avoid these foods:  vegetables, fried / greasy foods, carbonated drinks for today         ACTIVITY:  You may resume your normal daily activities it is recommended that you spend the remainder of the day resting -  avoid any strenuous activity. CALL M.D. ANY SIGN OF:   Increasing pain, nausea, vomiting  Abdominal distension (swelling)  New increased bleeding (oral or rectal)  Fever (chills)  Pain in chest area  Bloody discharge from nose or mouth  Shortness of breath     Follow-up Instructions:   Call Dr. Diamond Snow for any questions or problems. Telephone # [phone]  Should have a repeat colonoscopy in 15 years    Impression:  Normal Colon      Learning About Coronavirus (COVID-19)  Coronavirus [phone]): Overview  What is coronavirus ([id number])? The coronavirus disease (COVID-19) is caused by a virus. It is an illness that was first found in [place], [place], in December 2019. It has since spread worldwide. The virus can cause fever, cough, and trouble breathing. In severe cases, it can cause pneumonia and make it hard to breathe without help. It can cause death. Coronaviruses are a large group of viruses. They cause the common cold.  They also cause more serious illnesses like Middle East respiratory syndrome (MERS) and severe acute respiratory syndrome (SARS). COVID-19 is caused by a novel coronavirus. That means it's a new type that has not been seen in people before. This virus spreads person-to-person through droplets from coughing and sneezing. It can also spread when you are close to someone who is infected. And it can spread when you touch something that has the virus on it, such as a doorknob or a tabletop. What can you do to protect yourself from coronavirus (COVID-19)? The best way to protect yourself from getting sick is to:  · Avoid areas where there is an outbreak. · Avoid contact with people who may be infected. · Wash your hands often with soap or alcohol-based hand sanitizers. · Avoid crowds and try to stay at least 6 feet away from other people. · Wash your hands often, especially after you cough or sneeze. Use soap and water, and scrub for at least 20 seconds. If soap and water aren't available, use an alcohol-based hand . · Avoid touching your mouth, nose, and eyes. What can you do to avoid spreading the virus to others? To help avoid spreading the virus to others:  · Cover your mouth with a tissue when you cough or sneeze. Then throw the tissue in the trash. · Use a disinfectant to clean things that you touch often. · Stay home if you are sick or have been exposed to the virus. Don't go to school, work, or public areas. And don't use public transportation. · If you are sick:  ? Leave your home only if you need to get medical care. But call the doctor's office first so they know you're coming. And wear a face mask, if you have one.  ? If you have a face mask, wear it whenever you're around other people. It can help stop the spread of the virus when you cough or sneeze. ? Clean and disinfect your home every day. Use household  and disinfectant wipes or sprays.  Take special care to clean things that you grab with your hands. These include doorknobs, remote controls, phones, and handles on your refrigerator and microwave. And don't forget countertops, tabletops, bathrooms, and computer keyboards. When to call for help  Call 911 anytime you think you may need emergency care. For example, call if:  · You have severe trouble breathing. (You can't talk at all.)  · You have constant chest pain or pressure. · You are severely dizzy or lightheaded. · You are confused or can't think clearly. · Your face and lips have a blue color. · You pass out (lose consciousness) or are very hard to wake up. Call your doctor now if you develop symptoms such as:  · Shortness of breath. · Fever. · Cough. If you need to get care, call ahead to the doctor's office for instructions before you go. Make sure you wear a face mask, if you have one, to prevent exposing other people to the virus. Where can you get the latest information? The following health organizations are tracking and studying this virus. Their websites contain the most up-to-date information. Catina Gamboa also learn what to do if you think you may have been exposed to the virus. · U.S. Centers for Disease Control and Prevention (CDC): The CDC provides updated news about the disease and travel advice. The website also tells you how to prevent the spread of infection. www.cdc.gov  · World Health Organization [facility]): WHO offers information about the virus outbreaks. WHO also has travel advice. www.who.int  Current as of: April 1, 2020               Content Version: 12.4  © 2006-2020 Healthwise[employer] Incorporated. Care instructions adapted under license by your healthcare professional. If you have questions about a medical condition or this instruction, always ask your healthcare professional. [address] any warranty or liability for your use of this information.

## 2020-06-09 ENCOUNTER — OFFICE VISIT (OUTPATIENT)
Dept: INTERNAL MEDICINE CLINIC | Age: 35
End: 2020-06-09

## 2020-06-09 VITALS
WEIGHT: 116 LBS | HEIGHT: 70 IN | DIASTOLIC BLOOD PRESSURE: 77 MMHG | SYSTOLIC BLOOD PRESSURE: 118 MMHG | HEART RATE: 84 BPM | RESPIRATION RATE: 14 BRPM | OXYGEN SATURATION: 100 % | TEMPERATURE: 97.9 F | BODY MASS INDEX: 16.61 KG/M2

## 2020-06-09 DIAGNOSIS — M23.8X2 JOINT LAXITY OF LEFT KNEE: ICD-10-CM

## 2020-06-09 DIAGNOSIS — E03.2 HYPOTHYROIDISM DUE TO MEDICATION: ICD-10-CM

## 2020-06-09 DIAGNOSIS — Z01.818 PREOPERATIVE EXAMINATION: Primary | ICD-10-CM

## 2020-06-09 DIAGNOSIS — M41.9 SCOLIOSIS OF THORACOLUMBAR SPINE, UNSPECIFIED SCOLIOSIS TYPE: ICD-10-CM

## 2020-06-09 NOTE — PROGRESS NOTES
[address] [place] [address] and Primary Care  [place] [address]  [address]  [facility] [address]  Phone:  [phone]  Fax: [phone]       Chief Complaint   Patient presents with    Pre-op Exam   .      SUBJECTIVE:    Kimo Cheng is a 28 y.o. female Comes in stating that she needs a preoperative examination for her anticipated hysterectomy and unilateral oophorectomy. She apparently has rather extensive endometriosis. She is in chronic pain. She continues to have intermittent pain in her left knee. She is compliant with her thyroid medication, but needs to make sure she is indeed euthyroid prior to the surgical procedure. Current Outpatient Medications   Medication Sig Dispense Refill    levothyroxine (SYNTHROID) 88 mcg tablet Take 1 tablet daily on empty stomach 30 Tab 11    sucralfate (CARAFATE) 1 gram tablet Take 1 Tab by mouth four (4) times daily. 120 Tab 11    ibuprofen (MOTRIN) 800 mg tablet Take  by mouth.  ferrous sulfate 325 mg (65 mg iron) tablet Take one tab po daily 30 Tab 3    ondansetron (ZOFRAN ODT) 4 mg disintegrating tablet Take 1 Tab by mouth every eight (8) hours as needed for Nausea. 20 Tab 1    PRENATAL VIT/IRON FUMARATE/FA (PRENATAL PO) Take  by mouth.  ASPIRIN/ACETAMINOPHEN/CAFFEINE (MIGRAINE PO) Take  by mouth.  ERGOCALCIFEROL, VITAMIN D2, (VITAMIN D2 PO) Take  by mouth.        Past Medical History:   Diagnosis Date    Arthritis     Ill-defined condition     heart murmur, since age of 15    Scoliosis     Sickle cell trait ([facility]     Thyroid disease      Past Surgical History:   Procedure Laterality Date    COLONOSCOPY N/A 5/28/2020    COLONOSCOPY:- [facility] Sung [facility]zier [facility] ENDOSCOPY    HX GYN      [address]  0/32/45    Open umbilical hernia repair    HX PARTIAL HYSTERECTOMY  11/21/2019     Allergies   Allergen Reactions    Egg Nausea and Vomiting    Flexeril [Cyclobenzaprine] Nausea and Vomiting         REVIEW OF SYSTEMS:  General: negative for - chills or fever  ENT: negative for - headaches, nasal congestion or tinnitus  Respiratory: negative for - cough, hemoptysis, shortness of breath or wheezing  Cardiovascular : negative for - chest pain, edema, palpitations or shortness of breath  Gastrointestinal: negative for - abdominal pain, blood in stools, heartburn or nausea/vomiting  Genito-Urinary: no dysuria, trouble voiding, or hematuria  Musculoskeletal: negative for - gait disturbance, joint pain, joint stiffness or joint swelling  Neurological: no TIA or stroke symptoms  Hematologic: no bruises, no bleeding, no swollen glands  Integument: no lumps, mole changes, nail changes or rash  Endocrine: no malaise/lethargy or unexpected weight changes      Social History     Socioeconomic History    Marital status: SINGLE     Spouse name: Not on file    Number of children: 1    Years of education: Not on file    Highest education level: Not on file   Occupational History    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   Tobacco Use    Smoking status: Never Smoker    Smokeless tobacco: Never Used   Substance and Sexual Activity    Alcohol use: No    Drug use: No    Sexual activity: Yes     Partners: Male     Family History   Problem Relation Age of Onset    Arthritis-osteo Mother     Hypertension Mother     Kidney Disease Mother     Kidney Disease Father     Asthma Father     Diabetes Maternal Aunt     Asthma Paternal Aunt     Diabetes Paternal Aunt     Cancer Paternal Grandmother     Asthma Sister     Cancer Maternal Uncle         liver cancer    Heart Attack Maternal Uncle        OBJECTIVE:    Visit Vitals  /77   Pulse 84   Temp 97.9 °F (36.6 °C) (Oral)   Resp 14   Ht 5' 10\" (1.778 m)   Wt 116 lb (52.6 kg)   SpO2 100%   BMI 16.64 kg/m²     CONSTITUTIONAL: well , well nourished, appears age appropriate  EYES: perrla, eom intact  ENMT:moist mucous membranes, pharynx clear  NECK: supple.  Thyroid normal  RESPIRATORY: Chest: clear to ascultation and percussion   CARDIOVASCULAR: Heart: regular rate and rhythm  GASTROINTESTINAL: Abdomen: soft, bowel sounds active  HEMATOLOGIC: no pathological lymph nodes palpated  MUSCULOSKELETAL: Extremities: no edema, pulse 1+   INTEGUMENT: No unusual rashes or suspicious skin lesions noted. Nails appear normal.  NEUROLOGIC: non-focal exam   MENTAL STATUS: alert and oriented, appropriate affect      ASSESSMENT:  1. Preoperative examination    2. Hypothyroidism due to medication    3. Joint laxity of left knee    4. Scoliosis of thoracolumbar spine, unspecified scoliosis type          PLAN:    1. The patient is medically stable for planned procedure pending review of her lab work. 2. She does have a history of hypothyroidism and historically has been euthyroid. I will await the results of her TSH. 3. She has rather impressive joint laxity of her left knee. She needs to see an orthopedic physician for better characterization. I suspect that as time goes on, she will develop premature osteoarthritis involving the joint. 4. She does have a history of scoliosis of the thoracolumbar spine, but this is reasonably stable. She currently has no back pain. .  Orders Placed This Encounter    TSH 3RD GENERATION         Follow-up and Dispositions    · Return in about 4 months (around 10/9/2020).            Rossy Dee MD

## 2020-06-09 NOTE — PROGRESS NOTES
Chief Complaint   Patient presents with    Pre-op Exam     1. Have you been to the ER, urgent care clinic since your last visit? Hospitalized since your last visit? No    2. Have you seen or consulted any other health care providers outside of the [address] [place] [address] since your last visit? Include any pap smears or colon screening.  No

## 2020-06-11 LAB — TSH SERPL DL<=0.005 MIU/L-ACNC: 2.63 UIU/ML (ref 0.45–4.5)

## 2021-09-30 ENCOUNTER — OFFICE VISIT (OUTPATIENT)
Dept: INTERNAL MEDICINE CLINIC | Age: 36
End: 2021-09-30
Payer: COMMERCIAL

## 2021-09-30 VITALS
DIASTOLIC BLOOD PRESSURE: 80 MMHG | RESPIRATION RATE: 16 BRPM | HEART RATE: 78 BPM | HEIGHT: 70 IN | OXYGEN SATURATION: 100 % | SYSTOLIC BLOOD PRESSURE: 119 MMHG | TEMPERATURE: 98 F | WEIGHT: 109.9 LBS | BODY MASS INDEX: 15.73 KG/M2

## 2021-09-30 DIAGNOSIS — G89.29 CHRONIC MIDLINE LOW BACK PAIN WITHOUT SCIATICA: Primary | ICD-10-CM

## 2021-09-30 DIAGNOSIS — M54.50 CHRONIC MIDLINE LOW BACK PAIN WITHOUT SCIATICA: Primary | ICD-10-CM

## 2021-09-30 DIAGNOSIS — M41.34 THORACOGENIC SCOLIOSIS OF THORACIC REGION: ICD-10-CM

## 2021-09-30 DIAGNOSIS — E03.2 HYPOTHYROIDISM DUE TO MEDICATION: ICD-10-CM

## 2021-09-30 DIAGNOSIS — N80.9 ENDOMETRIOSIS: ICD-10-CM

## 2021-09-30 PROCEDURE — 72100 X-RAY EXAM L-S SPINE 2/3 VWS: CPT | Performed by: INTERNAL MEDICINE

## 2021-09-30 PROCEDURE — 99213 OFFICE O/P EST LOW 20 MIN: CPT | Performed by: INTERNAL MEDICINE

## 2021-09-30 RX ORDER — TRAMADOL HYDROCHLORIDE 50 MG/1
50 TABLET ORAL AS NEEDED
COMMUNITY

## 2021-09-30 NOTE — PROGRESS NOTES
Chief Complaint   Patient presents with    Cough    Back Pain         1. Have you been to the ER, urgent care clinic since your last visit? Hospitalized since your last visit? Yes When: 8/2021 Where: [address]  Reason for visit: back surgery     2. Have you seen or consulted any other health care providers outside of the [address] [place] [address] since your last visit? Include any pap smears or colon screening.  No

## 2021-10-10 PROBLEM — M54.9 BACK PAIN: Status: ACTIVE | Noted: 2021-10-10

## 2021-10-10 PROBLEM — M41.34 THORACOGENIC SCOLIOSIS OF THORACIC REGION: Status: ACTIVE | Noted: 2021-10-10

## 2021-10-10 PROBLEM — N80.9 ENDOMETRIOSIS: Status: ACTIVE | Noted: 2021-10-10

## 2021-10-10 NOTE — PROGRESS NOTES
[address] [facility] and Primary [facility]  [place] [address]  [address] [place] [address]  Phone:  [phone]  Fax: [phone]       Chief Complaint   Patient presents with    Cough    Back Pain   . SUBJECTIVE:    Bran Herrera is a 39 y.o. female comes in for return visit after long absence. She is now seeing an endocrinologist for her thyroid status although absolutely no changes were made in the current medications that I put her on. She comes in complaining of low back pain which is a chronic ongoing problem for her. It was suggested by her gynecologist that there might be an association to this and her chronic endometriosis, which has been rather extensive. She has had no untoward physical activity that could indeed aggravate this. She does however have a mild scoliosis in her lumbar area, as well as a more severe one at the thoracic region. Current Outpatient Medications   Medication Sig Dispense Refill    traMADoL (ULTRAM) 50 mg tablet Take 50 mg by mouth as needed for Pain.  levothyroxine (SYNTHROID) 88 mcg tablet Take 1 tablet daily on empty stomach 30 Tab 11    sucralfate (CARAFATE) 1 gram tablet Take 1 Tab by mouth four (4) times daily. 120 Tab 11    ibuprofen (MOTRIN) 800 mg tablet Take  by mouth.  ferrous sulfate 325 mg (65 mg iron) tablet Take one tab po daily 30 Tab 3    ondansetron (ZOFRAN ODT) 4 mg disintegrating tablet Take 1 Tab by mouth every eight (8) hours as needed for Nausea. 20 Tab 1    PRENATAL VIT/IRON FUMARATE/FA (PRENATAL PO) Take  by mouth.  ASPIRIN/ACETAMINOPHEN/CAFFEINE (MIGRAINE PO) Take  by mouth.  ERGOCALCIFEROL, VITAMIN D2, (VITAMIN D2 PO) Take  by mouth.        Past Medical History:   Diagnosis Date    Arthritis     Ill-defined condition     heart murmur, since age of 15    Scoliosis     Sickle cell trait ([facility] [address]     Thyroid disease      Past Surgical History:   Procedure Laterality Date    COLONOSCOPY N/A 5/28/2020 COLONOSCOPY:- [facility] My Hernandez [facility] at [facility] END[facility]COPY    HX GYN      HX HERNIA REPAIR  5/60/52    Open umbilical hernia repair    HX PARTIAL HYSTERECTOMY  11/21/2019     Allergies   Allergen Reactions    Egg Nausea and Vomiting    Flexeril [Cyclobenzaprine] Nausea and Vomiting         REVIEW OF SYSTEMS:  General: negative for - chills or fever  ENT: negative for - headaches, nasal congestion or tinnitus  Respiratory: negative for - cough, hemoptysis, shortness of breath or wheezing  Cardiovascular : negative for - chest pain, edema, palpitations or shortness of breath  Gastrointestinal: negative for - abdominal pain, blood in stools, heartburn or nausea/vomiting  Genito-Urinary: no dysuria, trouble voiding, or hematuria  Musculoskeletal: negative for - gait disturbance, joint pain, joint stiffness or joint swelling  Neurological: no TIA or stroke symptoms  Hematologic: no bruises, no bleeding, no swollen glands  Integument: no lumps, mole changes, nail changes or rash  Endocrine: no malaise/lethargy or unexpected weight changes      Social History     Socioeconomic History    Marital status: SINGLE     Spouse name: Not on file    Number of children: 1    Years of education: Not on file    Highest education level: Not on file   Occupational History    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   Tobacco Use    Smoking status: Never Smoker    Smokeless tobacco: Never Used   Substance and Sexual Activity    Alcohol use: No    Drug use: No    Sexual activity: Yes     Partners: Male     Social Determinants of Health     Financial Resource Strain:     Difficulty of Paying Living Expenses:    Food Insecurity:     Worried About Running Out of Food in the Last Year:     Ran Out of Food in the Last Year:    Transportation Needs:     Lack of Transportation (Medical):      Lack of Transportation (Non-Medical):    Physical Activity:     Days of Exercise per Week:     Minutes of Exercise per Session:    Stress:  Feeling of Stress :    Social Connections:     Frequency of Communication with Friends and Family:     Frequency of Social Gatherings with Friends and Family:     Attends Muslim Services:     Active Member of Clubs or Organizations:     Attends Club or Organization Meetings:     Marital Status:      Family History   Problem Relation Age of Onset    Arthritis-osteo Mother     Hypertension Mother     Kidney Disease Mother     Kidney Disease Father     Asthma Father     Diabetes Maternal Aunt     Asthma Paternal Aunt     Diabetes Paternal Aunt     Cancer Paternal Grandmother     Asthma Sister     Cancer Maternal Uncle         liver cancer    Heart Attack Maternal Uncle        OBJECTIVE:    Visit Vitals  /80   Pulse 78   Temp 98 °F (36.7 °C) (Oral)   Resp 16   Ht 5' 10\" (1.778 m)   Wt 109 lb 14.4 oz (49.9 kg)   SpO2 100%   BMI 15.77 kg/m²     CONSTITUTIONAL: well , well nourished, appears age appropriate  EYES: perrla, eom intact  ENMT:moist mucous membranes, pharynx clear  NECK: supple. Thyroid normal  RESPIRATORY: Chest: clear to ascultation and percussion   CARDIOVASCULAR: Heart: regular rate and rhythm  GASTROINTESTINAL: Abdomen: soft, bowel sounds active  HEMATOLOGIC: no pathological lymph nodes palpated  MUSCULOSKELETAL: Extremities: no edema, pulse 1+   INTEGUMENT: No unusual rashes or suspicious skin lesions noted. Nails appear normal.  NEUROLOGIC: non-focal exam   MENTAL STATUS: alert and oriented, appropriate affect      ASSESSMENT:  1. Chronic midline low back pain without sciatica    2. Thoracogenic scoliosis of thoracic region    3. Endometriosis    4. Hypothyroidism due to medication        PLAN:  1. She has chronic low back pain possibly related to her scoliosis, but I suspect this could be on the face of mechanical dysfunction also. I will start physical therapy for her. 2. I do not think endometriosis is a factor in this.   She will follow up with the gynecologist as it relates to this. 3. She will also follow up with her endocrinologist regarding her hypothyroidism. Orders Placed This Encounter    XR SPINE LUMB 2 OR 3 V    REFERRAL TO PHYSICAL THERAPY    traMADoL (ULTRAM) 50 mg tablet         Follow-up and Dispositions    · Return in about 4 weeks (around 10/28/2021).            Ghazala Serrano MD

## 2022-03-18 PROBLEM — F41.9 ANXIETY: Status: ACTIVE | Noted: 2020-04-20

## 2022-03-18 PROBLEM — M41.34 THORACOGENIC SCOLIOSIS OF THORACIC REGION: Status: ACTIVE | Noted: 2021-10-10

## 2022-03-18 PROBLEM — N80.9 ENDOMETRIOSIS: Status: ACTIVE | Noted: 2021-10-10

## 2022-03-19 PROBLEM — N28.9 RENAL LESION: Status: ACTIVE | Noted: 2018-03-29

## 2022-03-19 PROBLEM — D64.9 ANEMIA: Status: ACTIVE | Noted: 2017-01-24

## 2022-03-19 PROBLEM — M54.9 BACK PAIN: Status: ACTIVE | Noted: 2021-10-10

## 2022-03-19 PROBLEM — G44.219 EPISODIC TENSION-TYPE HEADACHE, NOT INTRACTABLE: Status: ACTIVE | Noted: 2018-01-13

## 2022-03-19 PROBLEM — Z80.3 FAMILY HISTORY OF BREAST CANCER: Status: ACTIVE | Noted: 2017-04-14

## 2022-03-20 PROBLEM — E03.2 HYPOTHYROIDISM DUE TO MEDICATION: Status: ACTIVE | Noted: 2017-07-27

## 2022-07-01 DIAGNOSIS — E03.2 HYPOTHYROIDISM DUE TO MEDICATION: ICD-10-CM

## 2022-07-02 RX ORDER — LEVOTHYROXINE SODIUM 88 UG/1
TABLET ORAL
Qty: 30 TABLET | Refills: 11 | Status: SHIPPED | OUTPATIENT
Start: 2022-07-02

## 2022-11-03 ENCOUNTER — OFFICE VISIT (OUTPATIENT)
Dept: INTERNAL MEDICINE CLINIC | Age: 37
End: 2022-11-03
Payer: COMMERCIAL

## 2022-11-03 VITALS
DIASTOLIC BLOOD PRESSURE: 84 MMHG | OXYGEN SATURATION: 99 % | WEIGHT: 111.6 LBS | SYSTOLIC BLOOD PRESSURE: 120 MMHG | BODY MASS INDEX: 15.98 KG/M2 | HEART RATE: 63 BPM | TEMPERATURE: 98.5 F | HEIGHT: 70 IN | RESPIRATION RATE: 16 BRPM

## 2022-11-03 DIAGNOSIS — J45.20 MILD INTERMITTENT REACTIVE AIRWAY DISEASE WITHOUT COMPLICATION: ICD-10-CM

## 2022-11-03 DIAGNOSIS — N20.0 KIDNEY STONE: ICD-10-CM

## 2022-11-03 DIAGNOSIS — E03.2 HYPOTHYROIDISM DUE TO MEDICATION: ICD-10-CM

## 2022-11-03 DIAGNOSIS — Z00.00 PHYSICAL EXAM: Primary | ICD-10-CM

## 2022-11-03 DIAGNOSIS — J30.0 VASOMOTOR RHINITIS: ICD-10-CM

## 2022-11-03 DIAGNOSIS — K21.00 GASTROESOPHAGEAL REFLUX DISEASE WITH ESOPHAGITIS WITHOUT HEMORRHAGE: ICD-10-CM

## 2022-11-03 DIAGNOSIS — N39.0 CHRONIC UTI: ICD-10-CM

## 2022-11-03 DIAGNOSIS — M41.9 SCOLIOSIS OF THORACOLUMBAR SPINE, UNSPECIFIED SCOLIOSIS TYPE: ICD-10-CM

## 2022-11-03 PROCEDURE — 99214 OFFICE O/P EST MOD 30 MIN: CPT | Performed by: INTERNAL MEDICINE

## 2022-11-03 PROCEDURE — 99395 PREV VISIT EST AGE 18-39: CPT | Performed by: INTERNAL MEDICINE

## 2022-11-03 RX ORDER — NITROFURANTOIN 25; 75 MG/1; MG/1
CAPSULE ORAL
COMMUNITY

## 2022-11-03 NOTE — PROGRESS NOTES
Fara Rodriguez is a 40 y.o. female. Chief Complaint   Patient presents with    Follow-up     Patient here for follow up. 1. Have you been to the ER, urgent care clinic since your last visit? Hospitalized since your last visit? Yes When: June 2022 Where: [id number] [place] [address] Reason for visit: laparoscopic surgery    2. Have you seen or consulted any other health care providers outside of the [address] [place] [address] since your last visit? Include any pap smears or colon screening.  No

## 2022-11-04 LAB
ALBUMIN SERPL-MCNC: 4.4 G/DL (ref 3.5–5)
ALBUMIN/GLOB SERPL: 1.3 {RATIO} (ref 1.1–2.2)
ALP SERPL-CCNC: 81 U/L (ref 45–117)
ALT SERPL-CCNC: 20 U/L (ref 12–78)
ANION GAP SERPL CALC-SCNC: 5 MMOL/L (ref 5–15)
APPEARANCE UR: CLEAR
AST SERPL-CCNC: 16 U/L (ref 15–37)
BASOPHILS # BLD: 0.1 K/UL (ref 0–0.1)
BASOPHILS NFR BLD: 1 % (ref 0–1)
BILIRUB SERPL-MCNC: 1 MG/DL (ref 0.2–1)
BILIRUB UR QL: NEGATIVE
BUN SERPL-MCNC: 12 MG/DL (ref 6–20)
BUN/CREAT SERPL: 18 (ref 12–20)
CALCIUM SERPL-MCNC: 9.9 MG/DL (ref 8.5–10.1)
CHLORIDE SERPL-SCNC: 109 MMOL/L (ref 97–108)
CHOLEST SERPL-MCNC: 213 MG/DL
CO2 SERPL-SCNC: 26 MMOL/L (ref 21–32)
COLOR UR: NORMAL
CREAT SERPL-MCNC: 0.67 MG/DL (ref 0.55–1.02)
DIFFERENTIAL METHOD BLD: NORMAL
EOSINOPHIL # BLD: 0.2 K/UL (ref 0–0.4)
EOSINOPHIL NFR BLD: 2 % (ref 0–7)
ERYTHROCYTE [DISTWIDTH] IN BLOOD BY AUTOMATED COUNT: 13.6 % (ref 11.5–14.5)
GLOBULIN SER CALC-MCNC: 3.3 G/DL (ref 2–4)
GLUCOSE SERPL-MCNC: 84 MG/DL (ref 65–100)
GLUCOSE UR STRIP.AUTO-MCNC: NEGATIVE MG/DL
HCT VFR BLD AUTO: 41.7 % (ref 35–47)
HDLC SERPL-MCNC: 81 MG/DL
HDLC SERPL: 2.6 {RATIO} (ref 0–5)
HGB BLD-MCNC: 13.9 G/DL (ref 11.5–16)
HGB UR QL STRIP: NEGATIVE
IMM GRANULOCYTES # BLD AUTO: 0 K/UL (ref 0–0.04)
IMM GRANULOCYTES NFR BLD AUTO: 0 % (ref 0–0.5)
KETONES UR QL STRIP.AUTO: NEGATIVE MG/DL
LDLC SERPL CALC-MCNC: 121.6 MG/DL (ref 0–100)
LEUKOCYTE ESTERASE UR QL STRIP.AUTO: NEGATIVE
LYMPHOCYTES # BLD: 1.4 K/UL (ref 0.8–3.5)
LYMPHOCYTES NFR BLD: 21 % (ref 12–49)
MCH RBC QN AUTO: 31.7 PG (ref 26–34)
MCHC RBC AUTO-ENTMCNC: 33.3 G/DL (ref 30–36.5)
MCV RBC AUTO: 95 FL (ref 80–99)
MONOCYTES # BLD: 0.6 K/UL (ref 0–1)
MONOCYTES NFR BLD: 9 % (ref 5–13)
NEUTS SEG # BLD: 4.2 K/UL (ref 1.8–8)
NEUTS SEG NFR BLD: 67 % (ref 32–75)
NITRITE UR QL STRIP.AUTO: NEGATIVE
NRBC # BLD: 0 K/UL (ref 0–0.01)
NRBC BLD-RTO: 0 PER 100 WBC
PH UR STRIP: 6.5 [PH] (ref 5–8)
PLATELET # BLD AUTO: 283 K/UL (ref 150–400)
PMV BLD AUTO: 10.3 FL (ref 8.9–12.9)
POTASSIUM SERPL-SCNC: 4.2 MMOL/L (ref 3.5–5.1)
PROT SERPL-MCNC: 7.7 G/DL (ref 6.4–8.2)
PROT UR STRIP-MCNC: NEGATIVE MG/DL
RBC # BLD AUTO: 4.39 M/UL (ref 3.8–5.2)
SODIUM SERPL-SCNC: 140 MMOL/L (ref 136–145)
SP GR UR REFRACTOMETRY: 1.01 (ref 1–1.03)
TRIGL SERPL-MCNC: 52 MG/DL (ref ?–150)
TSH SERPL DL<=0.05 MIU/L-ACNC: 14.5 UIU/ML (ref 0.36–3.74)
UROBILINOGEN UR QL STRIP.AUTO: 0.2 EU/DL (ref 0.2–1)
VLDLC SERPL CALC-MCNC: 10.4 MG/DL
WBC # BLD AUTO: 6.4 K/UL (ref 3.6–11)

## 2022-11-06 PROBLEM — J30.0 VASOMOTOR RHINITIS: Status: ACTIVE | Noted: 2022-11-06

## 2022-11-06 PROBLEM — N20.0 KIDNEY STONE: Status: ACTIVE | Noted: 2018-03-29

## 2022-11-06 PROBLEM — J45.909 REACTIVE AIRWAY DISEASE: Status: ACTIVE | Noted: 2022-11-06

## 2022-11-06 PROBLEM — N39.0 CHRONIC UTI: Status: ACTIVE | Noted: 2022-11-06

## 2022-11-06 PROBLEM — K21.00 GASTROESOPHAGEAL REFLUX DISEASE WITH ESOPHAGITIS WITHOUT HEMORRHAGE: Status: ACTIVE | Noted: 2022-11-06

## 2022-11-06 RX ORDER — FLUTICASONE PROPIONATE 50 MCG
SPRAY, SUSPENSION (ML) NASAL
Qty: 1 EACH | Refills: 11 | Status: SHIPPED | OUTPATIENT
Start: 2022-11-06

## 2022-11-06 RX ORDER — ALBUTEROL SULFATE 90 UG/1
2 AEROSOL, METERED RESPIRATORY (INHALATION)
Qty: 18 G | Refills: 11 | Status: SHIPPED | OUTPATIENT
Start: 2022-11-06

## 2022-11-06 RX ORDER — PANTOPRAZOLE SODIUM 40 MG/1
40 TABLET, DELAYED RELEASE ORAL DAILY
Qty: 30 TABLET | Refills: 11 | Status: SHIPPED | OUTPATIENT
Start: 2022-11-06 | End: 2022-11-07 | Stop reason: SDUPTHER

## 2022-11-06 NOTE — PROGRESS NOTES
[address] [place] [address] and Primary Care  [place] [address]  [address] [place] [id number]  Phone:  [phone]  Fax: [phone]       Chief Complaint   Patient presents with    Follow-up     Patient here for follow up. .      SUBJECTIVE:    Fara Rodriguez is a 40 y.o. female comes in for a physical examination. Most of her care is now by the endocrinologist primarily for refills on her Levoxyl, which she has not gone to see of late. Since I last saw her, she has seen an urologist because of frequent bladder infections. She is now maintained on suppressive therapy with nitrofurantoin one capsule daily. She states that she has a history of kidney stone, particularly on the left side. She complains of pain on the left side and thinks this is related to that, but in reality the stone apparently remains in the kidney, which would not cause any actual pain. She has a cyst on her ovary and follows up with her gynecologist.    She has frequent clearing of her throat accompanied on occasion by wheezing. She also has dyspeptic-type symptoms. Finally, she has chronic low back pain attributed to her scoliosis. Current Outpatient Medications   Medication Sig Dispense Refill    albuterol (PROVENTIL HFA, VENTOLIN HFA, PROAIR HFA) 90 mcg/actuation inhaler Take 2 Puffs by inhalation every four (4) hours as needed for Wheezing. 18 g 11    fluticasone propionate (FLONASE) 50 mcg/actuation nasal spray 2 sprays in each nostril daily 1 Each 11    pantoprazole (PROTONIX) 40 mg tablet Take 1 Tablet by mouth daily. 30 Tablet 11    levothyroxine (SYNTHROID) 88 mcg tablet Take 1 tablet daily on empty stomach 30 Tablet 11    ERGOCALCIFEROL, VITAMIN D2, (VITAMIN D2 PO) Take  by mouth.       nitrofurantoin, macrocrystal-monohydrate, (MACROBID) 100 mg capsule nitrofurantoin monohydrate/macrocrystals 100 mg capsule   TAKE 1 CAPSULE BY MOUTH EVERY 12 HOURS FOR 5 DAYS       Past Medical History:   Diagnosis Date Arthritis     Ill-defined condition     heart murmur, since age of 15    Scoliosis     Sickle cell trait ([facility] [address]     Thyroid disease      Past Surgical History:   Procedure Laterality Date    COLONOSCOPY N/A 5/28/2020    COLONOSCOPY:- performed by Joanie Shafer MD at [facility] ENDOSCOPY    HX GYN      [address]  3/21/54    Open umbilical hernia repair    HX PARTIAL HYSTERECTOMY  11/21/2019     Allergies   Allergen Reactions    Egg Nausea and Vomiting    Flexeril [Cyclobenzaprine] Nausea and Vomiting         REVIEW OF SYSTEMS:  General: negative for - chills or fever  ENT: negative for - headaches, nasal congestion or tinnitus  Respiratory: negative for - cough, hemoptysis, shortness of breath or wheezing  Cardiovascular : negative for - chest pain, edema, palpitations or shortness of breath  Gastrointestinal: negative for - abdominal pain, blood in stools, heartburn or nausea/vomiting  Genito-Urinary: no dysuria, trouble voiding, or hematuria  Musculoskeletal: negative for - gait disturbance, joint pain, joint stiffness or joint swelling  Neurological: no TIA or stroke symptoms  Hematologic: no bruises, no bleeding, no swollen glands  Integument: no lumps, mole changes, nail changes or rash  Endocrine: no malaise/lethargy or unexpected weight changes      Social History     Socioeconomic History    Marital status: SINGLE    Number of children: 1   Occupational History    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   Tobacco Use    Smoking status: Never    Smokeless tobacco: Never   Vaping Use    Vaping Use: Never used   Substance and Sexual Activity    Alcohol use: No    Drug use: No    Sexual activity: Yes     Partners: Male     Family History   Problem Relation Age of Onset    OSTEOARTHRITIS Mother     Hypertension Mother     Kidney Disease Mother     Kidney Disease Father     Asthma Father     Diabetes Maternal Aunt     Asthma Paternal Aunt     Diabetes Paternal Aunt     Cancer Paternal Grandmother     Asthma Sister Cancer Maternal Uncle         liver cancer    Heart Attack Maternal Uncle        OBJECTIVE:    Visit Vitals  /84   Pulse 63   Temp 98.5 °F (36.9 °C) (Oral)   Resp 16   Ht 5' 10\" (1.778 m)   Wt 111 lb 9.6 oz (50.6 kg)   SpO2 99%   BMI 16.01 kg/m²     CONSTITUTIONAL: well , well nourished, appears age appropriate  EYES: perrla, eom intact  ENMT:moist mucous membranes, pharynx clear  NECK: supple. Thyroid normal  RESPIRATORY: Chest: clear to ascultation and percussion   CARDIOVASCULAR: Heart: regular rate and rhythm  GASTROINTESTINAL: Abdomen: soft, bowel sounds active  HEMATOLOGIC: no pathological lymph nodes palpated  MUSCULOSKELETAL: Extremities: no edema, pulse 1+   INTEGUMENT: No unusual rashes or suspicious skin lesions noted. Nails appear normal.  NEUROLOGIC: non-focal exam   MENTAL STATUS: alert and oriented, appropriate affect      ASSESSMENT:  1. Physical exam    2. Hypothyroidism due to medication    3. Vasomotor rhinitis    4. Mild intermittent reactive airway disease without complication    5. Kidney stone    6. Chronic UTI    7. Gastroesophageal reflux disease with esophagitis without hemorrhage    8. Scoliosis of thoracolumbar spine, unspecified scoliosis type        PLAN:  1. The patient has hypothyroidism and I will check her TSH to ensure that efficacy and compliance are occurring at the current dose. There have been no dose changes in the last several years. 2. She has what appears to be a vasomotor rhinitis. I will try fluticasone. 3. The patient has been using an inhaler presumably for unknown reasons other than possible occult bronchospasm. Refill on this will be given. 4. She will followup with the urologist regarding her kidney stone and chronic UTIs. 5. She has a history of gastroesophageal reflux and will be imperatively treated with a PPI. 6. There is very little that I can do about her chronic low back pain.   Local measures would be indicated such as local heat and for that matter a chiropractor. .  Orders Placed This Encounter    CBC WITH AUTOMATED DIFF    LIPID PANEL    METABOLIC PANEL, COMPREHENSIVE    URINALYSIS W/ RFLX MICROSCOPIC    TSH 3RD GENERATION    nitrofurantoin, macrocrystal-monohydrate, (MACROBID) 100 mg capsule    albuterol (PROVENTIL HFA, VENTOLIN HFA, PROAIR HFA) 90 mcg/actuation inhaler    fluticasone propionate (FLONASE) 50 mcg/actuation nasal spray    pantoprazole (PROTONIX) 40 mg tablet         Follow-up and Dispositions    Return in about 6 months (around 5/3/2023).            Kate Fernández MD

## 2022-11-07 RX ORDER — PANTOPRAZOLE SODIUM 40 MG/1
40 TABLET, DELAYED RELEASE ORAL DAILY
Qty: 30 TABLET | Refills: 11 | Status: SHIPPED | OUTPATIENT
Start: 2022-11-07 | End: 2022-11-10

## 2022-11-10 DIAGNOSIS — E03.2 HYPOTHYROIDISM DUE TO MEDICATION: ICD-10-CM

## 2022-11-10 RX ORDER — LEVOTHYROXINE SODIUM 100 UG/1
TABLET ORAL
Qty: 30 TABLET | Refills: 3 | Status: SHIPPED | OUTPATIENT
Start: 2022-11-10

## 2022-11-10 RX ORDER — OMEPRAZOLE 40 MG/1
40 CAPSULE, DELAYED RELEASE ORAL DAILY
Qty: 30 CAPSULE | Refills: 11 | Status: SHIPPED | OUTPATIENT
Start: 2022-11-10

## 2022-11-11 ENCOUNTER — TELEPHONE (OUTPATIENT)
Dept: INTERNAL MEDICINE CLINIC | Age: 37
End: 2022-11-11

## 2022-11-11 NOTE — TELEPHONE ENCOUNTER
Patient notified of elevated tsh and new strength levothyroxine sent into pharmacy and she is scheduled for repeat tsh in 1 month

## 2022-11-11 NOTE — TELEPHONE ENCOUNTER
----- Message from Edith De Leon MD sent at 11/10/2022  9:42 PM EST -----  Tell patient she needs to increase her thyroid medication------ repeat TSH 1 month

## 2022-12-13 ENCOUNTER — TELEPHONE (OUTPATIENT)
Dept: INTERNAL MEDICINE CLINIC | Age: 37
End: 2022-12-13

## 2022-12-19 ENCOUNTER — TELEPHONE (OUTPATIENT)
Dept: INTERNAL MEDICINE CLINIC | Age: 37
End: 2022-12-19

## 2023-01-23 ENCOUNTER — CLINICAL SUPPORT (OUTPATIENT)
Dept: INTERNAL MEDICINE CLINIC | Age: 38
End: 2023-01-23

## 2023-01-23 DIAGNOSIS — E03.2 HYPOTHYROIDISM DUE TO MEDICATION: Primary | ICD-10-CM

## 2023-01-24 DIAGNOSIS — E03.2 HYPOTHYROIDISM DUE TO MEDICATION: ICD-10-CM

## 2023-01-24 LAB — TSH SERPL DL<=0.05 MIU/L-ACNC: 10.3 UIU/ML (ref 0.36–3.74)

## 2023-01-24 RX ORDER — LEVOTHYROXINE SODIUM 112 UG/1
TABLET ORAL
Qty: 30 TABLET | Refills: 11 | Status: SHIPPED | OUTPATIENT
Start: 2023-01-24

## 2023-01-27 ENCOUNTER — TELEPHONE (OUTPATIENT)
Dept: INTERNAL MEDICINE CLINIC | Age: 38
End: 2023-01-27

## 2023-01-27 NOTE — TELEPHONE ENCOUNTER
Patient notified of increased dose of thyroid meds to 112mcg and she is ask to repeat TSH in 2 months

## 2023-01-27 NOTE — TELEPHONE ENCOUNTER
----- Message from Aguilar Cain MD sent at 1/24/2023  9:38 PM EST -----  Thyroid medication needs to be increased with repeat TSH in 2 months

## 2023-06-06 ENCOUNTER — TELEPHONE (OUTPATIENT)
Facility: CLINIC | Age: 38
End: 2023-06-06

## 2023-06-06 NOTE — TELEPHONE ENCOUNTER
Patient called today and wanting to know if she can come in tomorrow to do blood work and lab to check her thyroid levels due to pt not feeling well and complications before.

## 2023-06-07 ENCOUNTER — NURSE ONLY (OUTPATIENT)
Facility: CLINIC | Age: 38
End: 2023-06-07

## 2023-06-07 DIAGNOSIS — E03.2 HYPOTHYROIDISM DUE TO MEDICAMENTS AND OTHER EXOGENOUS SUBSTANCES: Primary | ICD-10-CM

## 2023-06-08 LAB — TSH SERPL DL<=0.05 MIU/L-ACNC: 1.46 UIU/ML (ref 0.36–3.74)

## 2024-01-22 ENCOUNTER — OFFICE VISIT (OUTPATIENT)
Facility: CLINIC | Age: 39
End: 2024-01-22
Payer: COMMERCIAL

## 2024-01-22 VITALS
SYSTOLIC BLOOD PRESSURE: 119 MMHG | OXYGEN SATURATION: 100 % | RESPIRATION RATE: 16 BRPM | TEMPERATURE: 98 F | HEART RATE: 101 BPM | DIASTOLIC BLOOD PRESSURE: 77 MMHG | HEIGHT: 70 IN | WEIGHT: 110 LBS | BODY MASS INDEX: 15.75 KG/M2

## 2024-01-22 DIAGNOSIS — Z00.00 PHYSICAL EXAM: ICD-10-CM

## 2024-01-22 DIAGNOSIS — E03.2 HYPOTHYROIDISM DUE TO MEDICATION: ICD-10-CM

## 2024-01-22 DIAGNOSIS — R05.1 ACUTE COUGH: ICD-10-CM

## 2024-01-22 DIAGNOSIS — R31.9 HEMATURIA, UNSPECIFIED TYPE: Primary | ICD-10-CM

## 2024-01-22 PROCEDURE — 99213 OFFICE O/P EST LOW 20 MIN: CPT | Performed by: INTERNAL MEDICINE

## 2024-01-22 PROCEDURE — 36415 COLL VENOUS BLD VENIPUNCTURE: CPT | Performed by: INTERNAL MEDICINE

## 2024-01-22 PROCEDURE — 99395 PREV VISIT EST AGE 18-39: CPT | Performed by: INTERNAL MEDICINE

## 2024-01-22 RX ORDER — TRAMADOL HYDROCHLORIDE 50 MG/1
50 TABLET ORAL EVERY 6 HOURS PRN
COMMUNITY
Start: 2024-01-17

## 2024-01-22 RX ORDER — BENZONATATE 100 MG/1
100 CAPSULE ORAL 3 TIMES DAILY PRN
Qty: 40 CAPSULE | Refills: 0 | Status: SHIPPED | OUTPATIENT
Start: 2024-01-22 | End: 2024-02-04

## 2024-01-22 RX ORDER — CLINDAMYCIN PHOSPHATE 20 MG/G
1 CREAM VAGINAL NIGHTLY
Status: ON HOLD | COMMUNITY
End: 2024-02-02

## 2024-01-22 RX ORDER — CIPROFLOXACIN 500 MG/1
500 TABLET, FILM COATED ORAL 2 TIMES DAILY
Status: ON HOLD | COMMUNITY
Start: 2024-01-03 | End: 2024-02-02

## 2024-01-22 SDOH — ECONOMIC STABILITY: INCOME INSECURITY: HOW HARD IS IT FOR YOU TO PAY FOR THE VERY BASICS LIKE FOOD, HOUSING, MEDICAL CARE, AND HEATING?: NOT HARD AT ALL

## 2024-01-22 SDOH — ECONOMIC STABILITY: FOOD INSECURITY: WITHIN THE PAST 12 MONTHS, THE FOOD YOU BOUGHT JUST DIDN'T LAST AND YOU DIDN'T HAVE MONEY TO GET MORE.: NEVER TRUE

## 2024-01-22 SDOH — ECONOMIC STABILITY: HOUSING INSECURITY
IN THE LAST 12 MONTHS, WAS THERE A TIME WHEN YOU DID NOT HAVE A STEADY PLACE TO SLEEP OR SLEPT IN A SHELTER (INCLUDING NOW)?: NO

## 2024-01-22 SDOH — ECONOMIC STABILITY: FOOD INSECURITY: WITHIN THE PAST 12 MONTHS, YOU WORRIED THAT YOUR FOOD WOULD RUN OUT BEFORE YOU GOT MONEY TO BUY MORE.: NEVER TRUE

## 2024-01-22 ASSESSMENT — PATIENT HEALTH QUESTIONNAIRE - PHQ9
SUM OF ALL RESPONSES TO PHQ9 QUESTIONS 1 & 2: 0
2. FEELING DOWN, DEPRESSED OR HOPELESS: 0
SUM OF ALL RESPONSES TO PHQ QUESTIONS 1-9: 0
1. LITTLE INTEREST OR PLEASURE IN DOING THINGS: 0
SUM OF ALL RESPONSES TO PHQ QUESTIONS 1-9: 0

## 2024-01-22 ASSESSMENT — ANXIETY QUESTIONNAIRES
IF YOU CHECKED OFF ANY PROBLEMS ON THIS QUESTIONNAIRE, HOW DIFFICULT HAVE THESE PROBLEMS MADE IT FOR YOU TO DO YOUR WORK, TAKE CARE OF THINGS AT HOME, OR GET ALONG WITH OTHER PEOPLE: NOT DIFFICULT AT ALL
5. BEING SO RESTLESS THAT IT IS HARD TO SIT STILL: 0
2. NOT BEING ABLE TO STOP OR CONTROL WORRYING: 0
7. FEELING AFRAID AS IF SOMETHING AWFUL MIGHT HAPPEN: 0
3. WORRYING TOO MUCH ABOUT DIFFERENT THINGS: 0
4. TROUBLE RELAXING: 0
6. BECOMING EASILY ANNOYED OR IRRITABLE: 0

## 2024-01-22 NOTE — PROGRESS NOTES
Chief Complaint   Patient presents with    Follow-up     \"Have you been to the ER, urgent care clinic since your last visit?  Hospitalized since your last visit?\"    YES - When: approximately 1  weeks ago.  Where and Why: [facility] bleeding from right kidney and next surgery scheduled for 1/30/24.    “Have you seen or consulted any other health care providers outside of [facility] since your last visit?”    NO        “Have you had a pap smear?”    NO

## 2024-01-22 NOTE — PROGRESS NOTES
[facility]  [address] [place] [address]  [address]  [facility] [address]  Phone:  [phone]  Fax: [phone]       Chief Complaint   Patient presents with    Follow-up   .      SUBJECTIVE:    Alyssa Panchal is a 39 y.o. female  Dictation on: 01/22/2024  4:44 PM by: ROSEMARIE MAZARIEGOS [[id number]]          Current Outpatient Medications   Medication Sig Dispense Refill    traMADol (ULTRAM) 50 MG tablet Take 1 tablet by mouth every 6 hours as needed for Pain.      clindamycin (CLEOCIN) 2 % vaginal cream Place 1 applicator vaginally nightly      ciprofloxacin (CIPRO) 500 MG tablet Take 1 tablet by mouth 2 times daily      benzonatate (TESSALON) 100 MG capsule Take 1 capsule by mouth 3 times daily as needed for Cough 40 capsule 0    albuterol sulfate HFA (PROVENTIL;VENTOLIN;PROAIR) 108 (90 Base) MCG/ACT inhaler Inhale 2 puffs into the lungs every 4 hours as needed      fluticasone (FLONASE) 50 MCG/ACT nasal spray 2 sprays in each nostril daily      levothyroxine (SYNTHROID) 112 MCG tablet Take 1 tablet daily on empty stomach      nitrofurantoin, macrocrystal-monohydrate, (MACROBID) 100 MG capsule nitrofurantoin monohydrate/macrocrystals 100 mg capsule   TAKE 1 CAPSULE BY MOUTH EVERY 12 HOURS FOR 5 DAYS      omeprazole (PRILOSEC) 40 MG delayed release capsule Take 1 capsule by mouth daily       No current facility-administered medications for this visit.     Past Medical History:   Diagnosis Date    Arthritis     Ill-defined condition     heart murmur, since age of 12    Scoliosis     Sickle cell trait (HCC)     Thyroid disease      Past Surgical History:   Procedure Laterality Date    GYN      HERNIA REPAIR  3/22/16    Open umbilical hernia repair    PARTIAL HYSTERECTOMY (CERVIX NOT REMOVED)  11/21/2019     Allergies   Allergen Reactions    Cyclobenzaprine Nausea And Vomiting    Egg Solids, Whole Nausea And Vomiting         REVIEW OF SYSTEMS:  General: negative for - chills or fever  ENT: negative

## 2024-01-23 LAB
ALBUMIN SERPL-MCNC: 4.1 G/DL (ref 3.5–5)
ALBUMIN/GLOB SERPL: 1.3 (ref 1.1–2.2)
ALP SERPL-CCNC: 60 U/L (ref 45–117)
ALT SERPL-CCNC: 16 U/L (ref 12–78)
ANION GAP SERPL CALC-SCNC: 5 MMOL/L (ref 5–15)
AST SERPL-CCNC: 13 U/L (ref 15–37)
BASOPHILS # BLD: 0.1 K/UL (ref 0–0.1)
BASOPHILS NFR BLD: 1 % (ref 0–1)
BILIRUB SERPL-MCNC: 0.9 MG/DL (ref 0.2–1)
BUN SERPL-MCNC: 9 MG/DL (ref 6–20)
BUN/CREAT SERPL: 17 (ref 12–20)
CALCIUM SERPL-MCNC: 10.1 MG/DL (ref 8.5–10.1)
CHLORIDE SERPL-SCNC: 109 MMOL/L (ref 97–108)
CHOLEST SERPL-MCNC: 177 MG/DL
CO2 SERPL-SCNC: 25 MMOL/L (ref 21–32)
CREAT SERPL-MCNC: 0.53 MG/DL (ref 0.55–1.02)
DIFFERENTIAL METHOD BLD: ABNORMAL
EOSINOPHIL # BLD: 0.2 K/UL (ref 0–0.4)
EOSINOPHIL NFR BLD: 2 % (ref 0–7)
ERYTHROCYTE [DISTWIDTH] IN BLOOD BY AUTOMATED COUNT: 13.4 % (ref 11.5–14.5)
GLOBULIN SER CALC-MCNC: 3.2 G/DL (ref 2–4)
GLUCOSE SERPL-MCNC: 95 MG/DL (ref 65–100)
HCT VFR BLD AUTO: 26.7 % (ref 35–47)
HDLC SERPL-MCNC: 74 MG/DL
HDLC SERPL: 2.4 (ref 0–5)
HGB BLD-MCNC: 8.9 G/DL (ref 11.5–16)
IMM GRANULOCYTES # BLD AUTO: 0 K/UL (ref 0–0.04)
IMM GRANULOCYTES NFR BLD AUTO: 0 % (ref 0–0.5)
LDLC SERPL CALC-MCNC: 90.6 MG/DL (ref 0–100)
LYMPHOCYTES # BLD: 1.9 K/UL (ref 0.8–3.5)
LYMPHOCYTES NFR BLD: 21 % (ref 12–49)
MCH RBC QN AUTO: 29.4 PG (ref 26–34)
MCHC RBC AUTO-ENTMCNC: 33.3 G/DL (ref 30–36.5)
MCV RBC AUTO: 88.1 FL (ref 80–99)
MONOCYTES # BLD: 1 K/UL (ref 0–1)
MONOCYTES NFR BLD: 11 % (ref 5–13)
NEUTS SEG # BLD: 5.8 K/UL (ref 1.8–8)
NEUTS SEG NFR BLD: 65 % (ref 32–75)
NRBC # BLD: 0 K/UL (ref 0–0.01)
NRBC BLD-RTO: 0 PER 100 WBC
PLATELET # BLD AUTO: 435 K/UL (ref 150–400)
PMV BLD AUTO: 10 FL (ref 8.9–12.9)
POTASSIUM SERPL-SCNC: 3.6 MMOL/L (ref 3.5–5.1)
PROT SERPL-MCNC: 7.3 G/DL (ref 6.4–8.2)
RBC # BLD AUTO: 3.03 M/UL (ref 3.8–5.2)
SODIUM SERPL-SCNC: 139 MMOL/L (ref 136–145)
TRIGL SERPL-MCNC: 62 MG/DL
TSH SERPL DL<=0.05 MIU/L-ACNC: 0.1 UIU/ML (ref 0.36–3.74)
VLDLC SERPL CALC-MCNC: 12.4 MG/DL
WBC # BLD AUTO: 8.9 K/UL (ref 3.6–11)

## 2024-01-23 NOTE — PROGRESS NOTES
[very poor audio quality - please read carefully for potential errors and/or omissions]     Comes in for a physical examination.  She has been having idiopathic gross hematuria coming from her right kidney.  The staple ... [inaudible] come out.  She is planning to have surgery ... [inaudible] the right kidney.  Radiographic ... [inaudible] gross abnormalities, including stones.    She also needs another refill on her Tessalon Perles.    She has a history of hypothyroidism and has been treated with radioactive iodine and needs a refill on medication.    The patient has otherwise been doing quite well.

## 2024-01-30 ENCOUNTER — HOSPITAL ENCOUNTER (INPATIENT)
Facility: HOSPITAL | Age: 39
LOS: 4 days | Discharge: HOME | DRG: 443 | End: 2024-02-03
Attending: STUDENT IN AN ORGANIZED HEALTH CARE EDUCATION/TRAINING PROGRAM | Admitting: STUDENT IN AN ORGANIZED HEALTH CARE EDUCATION/TRAINING PROGRAM
Payer: COMMERCIAL

## 2024-01-30 DIAGNOSIS — T14.8XXA HEMATOMA: Primary | ICD-10-CM

## 2024-01-30 DIAGNOSIS — M54.50 ACUTE RIGHT-SIDED LOW BACK PAIN WITHOUT SCIATICA: ICD-10-CM

## 2024-01-30 LAB
ANION GAP BLD CALC-SCNC: 12.9 MMOL/L (ref 10–20)
APTT PPP: 28.5 SEC (ref 22.1–31)
BASOPHILS # BLD: 0.1 K/UL (ref 0–0.1)
BASOPHILS NFR BLD: 1 % (ref 0–1)
CA-I BLD-MCNC: 1.31 MMOL/L (ref 1.12–1.32)
CHLORIDE BLD-SCNC: 109 MMOL/L (ref 98–107)
CO2 BLD-SCNC: 18.1 MMOL/L (ref 21–32)
CREAT BLD-MCNC: 0.48 MG/DL (ref 0.6–1.3)
DIFFERENTIAL METHOD BLD: ABNORMAL
EOSINOPHIL # BLD: 0.2 K/UL (ref 0–0.4)
EOSINOPHIL NFR BLD: 2 % (ref 0–7)
ERYTHROCYTE [DISTWIDTH] IN BLOOD BY AUTOMATED COUNT: 14.7 % (ref 11.5–14.5)
GLUCOSE BLD-MCNC: 80 MG/DL (ref 65–100)
HCT VFR BLD AUTO: 18.4 % (ref 35–47)
HGB BLD-MCNC: 6.1 G/DL (ref 11.5–16)
HISTORY CHECK: NORMAL
HISTORY CHECK: NORMAL
IMM GRANULOCYTES # BLD AUTO: 0 K/UL (ref 0–0.04)
IMM GRANULOCYTES NFR BLD AUTO: 0 % (ref 0–0.5)
LYMPHOCYTES # BLD: 1.5 K/UL (ref 0.8–3.5)
LYMPHOCYTES NFR BLD: 19 % (ref 12–49)
MCH RBC QN AUTO: 27.2 PG (ref 26–34)
MCHC RBC AUTO-ENTMCNC: 33.2 G/DL (ref 30–36.5)
MCV RBC AUTO: 82.1 FL (ref 80–99)
MONOCYTES # BLD: 0.7 K/UL (ref 0–1)
MONOCYTES NFR BLD: 9 % (ref 5–13)
NEUTS SEG # BLD: 5.2 K/UL (ref 1.8–8)
NEUTS SEG NFR BLD: 69 % (ref 32–75)
NRBC # BLD: 0 K/UL (ref 0–0.01)
NRBC BLD-RTO: 0 PER 100 WBC
PLATELET # BLD AUTO: 371 K/UL (ref 150–400)
PLATELET COMMENT: ABNORMAL
PMV BLD AUTO: 9.9 FL (ref 8.9–12.9)
POTASSIUM BLD-SCNC: 3.6 MMOL/L (ref 3.5–5.1)
RBC # BLD AUTO: 2.24 M/UL (ref 3.8–5.2)
RBC MORPH BLD: ABNORMAL
SERVICE CMNT-IMP: ABNORMAL
SODIUM BLD-SCNC: 140 MMOL/L (ref 136–145)
THERAPEUTIC RANGE: NORMAL SECS (ref 58–77)
WBC # BLD AUTO: 7.7 K/UL (ref 3.6–11)

## 2024-01-30 PROCEDURE — 6370000000 HC RX 637 (ALT 250 FOR IP): Performed by: ANESTHESIOLOGY

## 2024-01-30 PROCEDURE — 86923 COMPATIBILITY TEST ELECTRIC: CPT

## 2024-01-30 PROCEDURE — 36415 COLL VENOUS BLD VENIPUNCTURE: CPT

## 2024-01-30 PROCEDURE — G0378 HOSPITAL OBSERVATION PER HR: HCPCS

## 2024-01-30 PROCEDURE — P9016 RBC LEUKOCYTES REDUCED: HCPCS

## 2024-01-30 PROCEDURE — 85730 THROMBOPLASTIN TIME PARTIAL: CPT

## 2024-01-30 PROCEDURE — 1100000000 HC RM PRIVATE

## 2024-01-30 PROCEDURE — 2580000003 HC RX 258: Performed by: ANESTHESIOLOGY

## 2024-01-30 PROCEDURE — 6370000000 HC RX 637 (ALT 250 FOR IP): Performed by: UROLOGY

## 2024-01-30 PROCEDURE — 36430 TRANSFUSION BLD/BLD COMPNT: CPT

## 2024-01-30 PROCEDURE — 80047 BASIC METABLC PNL IONIZED CA: CPT

## 2024-01-30 PROCEDURE — 85025 COMPLETE CBC W/AUTO DIFF WBC: CPT

## 2024-01-30 PROCEDURE — 85018 HEMOGLOBIN: CPT

## 2024-01-30 PROCEDURE — 86850 RBC ANTIBODY SCREEN: CPT

## 2024-01-30 PROCEDURE — 86900 BLOOD TYPING SEROLOGIC ABO: CPT

## 2024-01-30 PROCEDURE — 86901 BLOOD TYPING SEROLOGIC RH(D): CPT

## 2024-01-30 PROCEDURE — 30233N1 TRANSFUSION OF NONAUTOLOGOUS RED BLOOD CELLS INTO PERIPHERAL VEIN, PERCUTANEOUS APPROACH: ICD-10-PCS | Performed by: STUDENT IN AN ORGANIZED HEALTH CARE EDUCATION/TRAINING PROGRAM

## 2024-01-30 RX ORDER — ALBUTEROL SULFATE 2.5 MG/3ML
2.5 SOLUTION RESPIRATORY (INHALATION) EVERY 6 HOURS PRN
Status: DISCONTINUED | OUTPATIENT
Start: 2024-01-30 | End: 2024-02-03 | Stop reason: HOSPADM

## 2024-01-30 RX ORDER — PROCHLORPERAZINE EDISYLATE 5 MG/ML
5 INJECTION INTRAMUSCULAR; INTRAVENOUS
Status: DISCONTINUED | OUTPATIENT
Start: 2024-01-30 | End: 2024-01-30 | Stop reason: HOSPADM

## 2024-01-30 RX ORDER — SODIUM CHLORIDE 9 MG/ML
INJECTION, SOLUTION INTRAVENOUS PRN
Status: DISCONTINUED | OUTPATIENT
Start: 2024-01-30 | End: 2024-01-30

## 2024-01-30 RX ORDER — DIPHENHYDRAMINE HCL 25 MG
25 CAPSULE ORAL EVERY 6 HOURS PRN
Status: DISCONTINUED | OUTPATIENT
Start: 2024-01-30 | End: 2024-02-03 | Stop reason: HOSPADM

## 2024-01-30 RX ORDER — FLUTICASONE PROPIONATE 50 MCG
2 SPRAY, SUSPENSION (ML) NASAL DAILY
Status: DISCONTINUED | OUTPATIENT
Start: 2024-01-30 | End: 2024-02-03 | Stop reason: HOSPADM

## 2024-01-30 RX ORDER — FENTANYL CITRATE 50 UG/ML
25 INJECTION, SOLUTION INTRAMUSCULAR; INTRAVENOUS EVERY 5 MIN PRN
Status: DISCONTINUED | OUTPATIENT
Start: 2024-01-30 | End: 2024-01-30 | Stop reason: HOSPADM

## 2024-01-30 RX ORDER — ONDANSETRON 2 MG/ML
4 INJECTION INTRAMUSCULAR; INTRAVENOUS EVERY 6 HOURS PRN
Status: DISCONTINUED | OUTPATIENT
Start: 2024-01-30 | End: 2024-02-03 | Stop reason: HOSPADM

## 2024-01-30 RX ORDER — TRAMADOL HYDROCHLORIDE 50 MG/1
50 TABLET ORAL EVERY 6 HOURS PRN
Status: DISCONTINUED | OUTPATIENT
Start: 2024-01-30 | End: 2024-02-03 | Stop reason: HOSPADM

## 2024-01-30 RX ORDER — LIDOCAINE HYDROCHLORIDE 10 MG/ML
1 INJECTION, SOLUTION EPIDURAL; INFILTRATION; INTRACAUDAL; PERINEURAL
Status: DISCONTINUED | OUTPATIENT
Start: 2024-01-30 | End: 2024-01-30

## 2024-01-30 RX ORDER — FENTANYL CITRATE 50 UG/ML
100 INJECTION, SOLUTION INTRAMUSCULAR; INTRAVENOUS
Status: DISCONTINUED | OUTPATIENT
Start: 2024-01-30 | End: 2024-01-30

## 2024-01-30 RX ORDER — SODIUM CHLORIDE 0.9 % (FLUSH) 0.9 %
5-40 SYRINGE (ML) INJECTION EVERY 12 HOURS SCHEDULED
Status: DISCONTINUED | OUTPATIENT
Start: 2024-01-30 | End: 2024-01-30

## 2024-01-30 RX ORDER — SODIUM CHLORIDE, SODIUM LACTATE, POTASSIUM CHLORIDE, CALCIUM CHLORIDE 600; 310; 30; 20 MG/100ML; MG/100ML; MG/100ML; MG/100ML
INJECTION, SOLUTION INTRAVENOUS CONTINUOUS
Status: DISCONTINUED | OUTPATIENT
Start: 2024-01-30 | End: 2024-01-30

## 2024-01-30 RX ORDER — ACETAMINOPHEN 500 MG
1000 TABLET ORAL ONCE
Status: COMPLETED | OUTPATIENT
Start: 2024-01-30 | End: 2024-01-30

## 2024-01-30 RX ORDER — PANTOPRAZOLE SODIUM 20 MG/1
40 TABLET, DELAYED RELEASE ORAL DAILY
Status: DISCONTINUED | OUTPATIENT
Start: 2024-01-30 | End: 2024-02-03 | Stop reason: HOSPADM

## 2024-01-30 RX ORDER — HYDRALAZINE HYDROCHLORIDE 20 MG/ML
10 INJECTION INTRAMUSCULAR; INTRAVENOUS
Status: DISCONTINUED | OUTPATIENT
Start: 2024-01-30 | End: 2024-01-30 | Stop reason: HOSPADM

## 2024-01-30 RX ORDER — LEVOTHYROXINE SODIUM 0.1 MG/1
100 TABLET ORAL DAILY
Qty: 90 TABLET | Refills: 3 | Status: SHIPPED | OUTPATIENT
Start: 2024-01-30

## 2024-01-30 RX ORDER — ONDANSETRON 2 MG/ML
4 INJECTION INTRAMUSCULAR; INTRAVENOUS AS NEEDED
Status: DISCONTINUED | OUTPATIENT
Start: 2024-01-30 | End: 2024-01-30

## 2024-01-30 RX ORDER — LEVOTHYROXINE SODIUM 112 UG/1
112 TABLET ORAL DAILY
Status: DISCONTINUED | OUTPATIENT
Start: 2024-01-30 | End: 2024-02-03 | Stop reason: HOSPADM

## 2024-01-30 RX ORDER — SODIUM CHLORIDE 0.9 % (FLUSH) 0.9 %
5-40 SYRINGE (ML) INJECTION PRN
Status: DISCONTINUED | OUTPATIENT
Start: 2024-01-30 | End: 2024-01-30 | Stop reason: HOSPADM

## 2024-01-30 RX ORDER — SODIUM CHLORIDE 0.9 % (FLUSH) 0.9 %
5-40 SYRINGE (ML) INJECTION EVERY 12 HOURS SCHEDULED
Status: DISCONTINUED | OUTPATIENT
Start: 2024-01-30 | End: 2024-01-30 | Stop reason: HOSPADM

## 2024-01-30 RX ORDER — OXYCODONE HYDROCHLORIDE 5 MG/1
5 TABLET ORAL
Status: DISCONTINUED | OUTPATIENT
Start: 2024-01-30 | End: 2024-01-30 | Stop reason: HOSPADM

## 2024-01-30 RX ORDER — SODIUM CHLORIDE 9 MG/ML
INJECTION, SOLUTION INTRAVENOUS PRN
Status: DISCONTINUED | OUTPATIENT
Start: 2024-01-30 | End: 2024-02-03 | Stop reason: HOSPADM

## 2024-01-30 RX ORDER — MIDAZOLAM HYDROCHLORIDE 2 MG/2ML
2 INJECTION, SOLUTION INTRAMUSCULAR; INTRAVENOUS
Status: DISCONTINUED | OUTPATIENT
Start: 2024-01-30 | End: 2024-01-30

## 2024-01-30 RX ORDER — HYDROMORPHONE HYDROCHLORIDE 1 MG/ML
0.5 INJECTION, SOLUTION INTRAMUSCULAR; INTRAVENOUS; SUBCUTANEOUS EVERY 5 MIN PRN
Status: DISCONTINUED | OUTPATIENT
Start: 2024-01-30 | End: 2024-01-30 | Stop reason: HOSPADM

## 2024-01-30 RX ORDER — ALBUTEROL SULFATE 90 UG/1
2 AEROSOL, METERED RESPIRATORY (INHALATION) EVERY 4 HOURS PRN
Status: DISCONTINUED | OUTPATIENT
Start: 2024-01-30 | End: 2024-01-30

## 2024-01-30 RX ORDER — SODIUM CHLORIDE 0.9 % (FLUSH) 0.9 %
5-40 SYRINGE (ML) INJECTION PRN
Status: DISCONTINUED | OUTPATIENT
Start: 2024-01-30 | End: 2024-01-30

## 2024-01-30 RX ORDER — BENZONATATE 100 MG/1
100 CAPSULE ORAL 3 TIMES DAILY PRN
Status: DISCONTINUED | OUTPATIENT
Start: 2024-01-30 | End: 2024-02-03 | Stop reason: HOSPADM

## 2024-01-30 RX ORDER — SODIUM CHLORIDE 9 MG/ML
INJECTION, SOLUTION INTRAVENOUS PRN
Status: DISCONTINUED | OUTPATIENT
Start: 2024-01-30 | End: 2024-01-30 | Stop reason: HOSPADM

## 2024-01-30 RX ORDER — HYDROCODONE BITARTRATE AND ACETAMINOPHEN 5; 325 MG/1; MG/1
1 TABLET ORAL EVERY 6 HOURS PRN
Status: DISCONTINUED | OUTPATIENT
Start: 2024-01-30 | End: 2024-02-03 | Stop reason: HOSPADM

## 2024-01-30 RX ORDER — SODIUM CHLORIDE 9 MG/ML
INJECTION, SOLUTION INTRAVENOUS CONTINUOUS
Status: DISCONTINUED | OUTPATIENT
Start: 2024-01-30 | End: 2024-01-31

## 2024-01-30 RX ORDER — ONDANSETRON 2 MG/ML
4 INJECTION INTRAMUSCULAR; INTRAVENOUS
Status: DISCONTINUED | OUTPATIENT
Start: 2024-01-30 | End: 2024-01-30 | Stop reason: HOSPADM

## 2024-01-30 RX ADMIN — SODIUM CHLORIDE, POTASSIUM CHLORIDE, SODIUM LACTATE AND CALCIUM CHLORIDE: 600; 310; 30; 20 INJECTION, SOLUTION INTRAVENOUS at 14:19

## 2024-01-30 RX ADMIN — ACETAMINOPHEN 1000 MG: 500 TABLET ORAL at 14:37

## 2024-01-30 RX ADMIN — DIPHENHYDRAMINE HYDROCHLORIDE 25 MG: 25 CAPSULE ORAL at 21:40

## 2024-01-30 ASSESSMENT — PAIN - FUNCTIONAL ASSESSMENT
PAIN_FUNCTIONAL_ASSESSMENT: NONE - DENIES PAIN
PAIN_FUNCTIONAL_ASSESSMENT: 0-10

## 2024-01-30 NOTE — PERIOP NOTE
TRANSFER - OUT REPORT:    Verbal report given to Gopi RN on Alyssa Panchal  being transferred to [facility] for ordered procedure       Report consisted of patient's Situation, Background, Assessment and   Recommendations(SBAR).     Information from the following report(s) Nurse Handoff Report, Adult Overview, Intake/Output, MAR, Recent Results, and Cardiac Rhythm SR  was reviewed with the receiving nurse.           Lines:   Peripheral IV 01/30/24 Left;Lower Arm (Active)   Site Assessment Clean, dry & intact 01/30/24 1422   Line Status Infusing 01/30/24 1422   Phlebitis Assessment No symptoms 01/30/24 1422   Infiltration Assessment 0 01/30/24 1422   Alcohol Cap Used No 01/30/24 1422   Dressing Status New dressing applied;Clean, dry & intact 01/30/24 1422   Dressing Type Transparent 01/30/24 1422   Dressing Intervention New 01/30/24 1422        Opportunity for questions and clarification was provided.      Patient transported with:  Tech

## 2024-01-30 NOTE — PROGRESS NOTES
Hgb low in preop and she is symptomatic   We discussed holding on procedure for now  I will plan to admit her  Place heme/onc consult -will go ahead and see about clotting disorder hale  Consult IR to see about embolization of kidney  May need to get ct prior

## 2024-01-30 NOTE — PERIOP NOTE
Hgb 6.1. Dr Barrera and Dr Cuevas aware. Plan to postpone surgery for now, admit, and transfuse PRBC.

## 2024-01-31 ENCOUNTER — APPOINTMENT (OUTPATIENT)
Facility: HOSPITAL | Age: 39
DRG: 443 | End: 2024-01-31
Attending: STUDENT IN AN ORGANIZED HEALTH CARE EDUCATION/TRAINING PROGRAM
Payer: COMMERCIAL

## 2024-01-31 LAB
ABO + RH BLD: NORMAL
BASOPHILS # BLD: 0 K/UL (ref 0–0.1)
BASOPHILS # BLD: 0.1 K/UL (ref 0–0.1)
BASOPHILS NFR BLD: 1 % (ref 0–1)
BASOPHILS NFR BLD: 1 % (ref 0–1)
BLD PROD TYP BPU: NORMAL
BLD PROD TYP BPU: NORMAL
BLOOD BANK DISPENSE STATUS: NORMAL
BLOOD BANK DISPENSE STATUS: NORMAL
BLOOD GROUP ANTIBODIES SERPL: NORMAL
BPU ID: NORMAL
BPU ID: NORMAL
COMMENT:: NORMAL
CROSSMATCH RESULT: NORMAL
CROSSMATCH RESULT: NORMAL
DIFFERENTIAL METHOD BLD: ABNORMAL
DIFFERENTIAL METHOD BLD: ABNORMAL
EOSINOPHIL # BLD: 0.3 K/UL (ref 0–0.4)
EOSINOPHIL # BLD: 0.4 K/UL (ref 0–0.4)
EOSINOPHIL NFR BLD: 5 % (ref 0–7)
EOSINOPHIL NFR BLD: 5 % (ref 0–7)
ERYTHROCYTE [DISTWIDTH] IN BLOOD BY AUTOMATED COUNT: 14.9 % (ref 11.5–14.5)
ERYTHROCYTE [DISTWIDTH] IN BLOOD BY AUTOMATED COUNT: 15.1 % (ref 11.5–14.5)
FERRITIN SERPL-MCNC: 4 NG/ML (ref 8–252)
FOLATE SERPL-MCNC: 13.6 NG/ML (ref 5–21)
HCT VFR BLD AUTO: 24.7 % (ref 35–47)
HCT VFR BLD AUTO: 28.1 % (ref 35–47)
HGB BLD-MCNC: 5.3 G/DL (ref 11.5–16)
HGB BLD-MCNC: 8.4 G/DL (ref 11.5–16)
HGB BLD-MCNC: 9.1 G/DL (ref 11.5–16)
IMM GRANULOCYTES # BLD AUTO: 0 K/UL (ref 0–0.04)
IMM GRANULOCYTES # BLD AUTO: 0 K/UL (ref 0–0.04)
IMM GRANULOCYTES NFR BLD AUTO: 0 % (ref 0–0.5)
IMM GRANULOCYTES NFR BLD AUTO: 0 % (ref 0–0.5)
IRON SATN MFR SERPL: 4 % (ref 20–50)
IRON SERPL-MCNC: 14 UG/DL (ref 35–150)
LYMPHOCYTES # BLD: 1.3 K/UL (ref 0.8–3.5)
LYMPHOCYTES # BLD: 1.9 K/UL (ref 0.8–3.5)
LYMPHOCYTES NFR BLD: 14 % (ref 12–49)
LYMPHOCYTES NFR BLD: 27 % (ref 12–49)
MCH RBC QN AUTO: 27.8 PG (ref 26–34)
MCH RBC QN AUTO: 28.4 PG (ref 26–34)
MCHC RBC AUTO-ENTMCNC: 32.4 G/DL (ref 30–36.5)
MCHC RBC AUTO-ENTMCNC: 34 G/DL (ref 30–36.5)
MCV RBC AUTO: 83.4 FL (ref 80–99)
MCV RBC AUTO: 85.9 FL (ref 80–99)
MONOCYTES # BLD: 0.9 K/UL (ref 0–1)
MONOCYTES # BLD: 1 K/UL (ref 0–1)
MONOCYTES NFR BLD: 10 % (ref 5–13)
MONOCYTES NFR BLD: 14 % (ref 5–13)
NEUTS SEG # BLD: 3.7 K/UL (ref 1.8–8)
NEUTS SEG # BLD: 6.4 K/UL (ref 1.8–8)
NEUTS SEG NFR BLD: 53 % (ref 32–75)
NEUTS SEG NFR BLD: 70 % (ref 32–75)
NRBC # BLD: 0 K/UL (ref 0–0.01)
NRBC # BLD: 0.03 K/UL (ref 0–0.01)
NRBC BLD-RTO: 0 PER 100 WBC
NRBC BLD-RTO: 0.4 PER 100 WBC
PLATELET # BLD AUTO: 322 K/UL (ref 150–400)
PLATELET # BLD AUTO: 360 K/UL (ref 150–400)
PMV BLD AUTO: 9.8 FL (ref 8.9–12.9)
PMV BLD AUTO: 9.9 FL (ref 8.9–12.9)
RBC # BLD AUTO: 2.96 M/UL (ref 3.8–5.2)
RBC # BLD AUTO: 3.27 M/UL (ref 3.8–5.2)
RETICS # AUTO: 0.07 M/UL (ref 0.02–0.08)
RETICS/RBC NFR AUTO: 2 % (ref 0.7–2.1)
SPECIMEN EXP DATE BLD: NORMAL
SPECIMEN HOLD: NORMAL
TIBC SERPL-MCNC: 378 UG/DL (ref 250–450)
UNIT DIVISION: 0
UNIT DIVISION: 0
VIT B12 SERPL-MCNC: 349 PG/ML (ref 193–986)
WBC # BLD AUTO: 7 K/UL (ref 3.6–11)
WBC # BLD AUTO: 9.2 K/UL (ref 3.6–11)

## 2024-01-31 PROCEDURE — 6360000004 HC RX CONTRAST MEDICATION: Performed by: STUDENT IN AN ORGANIZED HEALTH CARE EDUCATION/TRAINING PROGRAM

## 2024-01-31 PROCEDURE — 2709999900 IR ARTERIOGRAM RENAL INITIAL ORDER BILATERAL

## 2024-01-31 PROCEDURE — 83550 IRON BINDING TEST: CPT

## 2024-01-31 PROCEDURE — 96366 THER/PROPH/DIAG IV INF ADDON: CPT

## 2024-01-31 PROCEDURE — 74175 CTA ABDOMEN W/CONTRAST: CPT

## 2024-01-31 PROCEDURE — 85045 AUTOMATED RETICULOCYTE COUNT: CPT

## 2024-01-31 PROCEDURE — B4181ZZ FLUOROSCOPY OF BILATERAL RENAL ARTERIES USING LOW OSMOLAR CONTRAST: ICD-10-PCS | Performed by: STUDENT IN AN ORGANIZED HEALTH CARE EDUCATION/TRAINING PROGRAM

## 2024-01-31 PROCEDURE — 6370000000 HC RX 637 (ALT 250 FOR IP): Performed by: UROLOGY

## 2024-01-31 PROCEDURE — 2580000003 HC RX 258: Performed by: STUDENT IN AN ORGANIZED HEALTH CARE EDUCATION/TRAINING PROGRAM

## 2024-01-31 PROCEDURE — 1100000000 HC RM PRIVATE

## 2024-01-31 PROCEDURE — 6360000002 HC RX W HCPCS: Performed by: STUDENT IN AN ORGANIZED HEALTH CARE EDUCATION/TRAINING PROGRAM

## 2024-01-31 PROCEDURE — 96375 TX/PRO/DX INJ NEW DRUG ADDON: CPT

## 2024-01-31 PROCEDURE — 82746 ASSAY OF FOLIC ACID SERUM: CPT

## 2024-01-31 PROCEDURE — 2500000003 HC RX 250 WO HCPCS: Performed by: STUDENT IN AN ORGANIZED HEALTH CARE EDUCATION/TRAINING PROGRAM

## 2024-01-31 PROCEDURE — 96365 THER/PROPH/DIAG IV INF INIT: CPT

## 2024-01-31 PROCEDURE — 82728 ASSAY OF FERRITIN: CPT

## 2024-01-31 PROCEDURE — 36415 COLL VENOUS BLD VENIPUNCTURE: CPT

## 2024-01-31 PROCEDURE — 85025 COMPLETE CBC W/AUTO DIFF WBC: CPT

## 2024-01-31 PROCEDURE — 6360000002 HC RX W HCPCS: Performed by: INTERNAL MEDICINE

## 2024-01-31 PROCEDURE — G0378 HOSPITAL OBSERVATION PER HR: HCPCS

## 2024-01-31 PROCEDURE — 83540 ASSAY OF IRON: CPT

## 2024-01-31 PROCEDURE — 2580000003 HC RX 258: Performed by: INTERNAL MEDICINE

## 2024-01-31 PROCEDURE — C1769 GUIDE WIRE: HCPCS

## 2024-01-31 PROCEDURE — 6370000000 HC RX 637 (ALT 250 FOR IP): Performed by: STUDENT IN AN ORGANIZED HEALTH CARE EDUCATION/TRAINING PROGRAM

## 2024-01-31 PROCEDURE — 82607 VITAMIN B-12: CPT

## 2024-01-31 PROCEDURE — 99152 MOD SED SAME PHYS/QHP 5/>YRS: CPT

## 2024-01-31 RX ORDER — LIDOCAINE HYDROCHLORIDE 10 MG/ML
INJECTION, SOLUTION EPIDURAL; INFILTRATION; INTRACAUDAL; PERINEURAL PRN
Status: COMPLETED | OUTPATIENT
Start: 2024-01-31 | End: 2024-01-31

## 2024-01-31 RX ORDER — SODIUM BICARBONATE 650 MG/1
650 TABLET ORAL 4 TIMES DAILY
Status: DISCONTINUED | OUTPATIENT
Start: 2024-01-31 | End: 2024-02-03 | Stop reason: HOSPADM

## 2024-01-31 RX ORDER — SODIUM CHLORIDE 9 MG/ML
INJECTION, SOLUTION INTRAVENOUS CONTINUOUS PRN
Status: COMPLETED | OUTPATIENT
Start: 2024-01-31 | End: 2024-01-31

## 2024-01-31 RX ORDER — MIDAZOLAM HYDROCHLORIDE 2 MG/2ML
INJECTION, SOLUTION INTRAMUSCULAR; INTRAVENOUS PRN
Status: COMPLETED | OUTPATIENT
Start: 2024-01-31 | End: 2024-01-31

## 2024-01-31 RX ADMIN — IOPAMIDOL 40 ML: 755 INJECTION, SOLUTION INTRAVENOUS at 09:50

## 2024-01-31 RX ADMIN — ONDANSETRON 4 MG: 2 INJECTION INTRAMUSCULAR; INTRAVENOUS at 19:52

## 2024-01-31 RX ADMIN — SODIUM BICARBONATE 650 MG: 650 TABLET ORAL at 17:52

## 2024-01-31 RX ADMIN — LEVOTHYROXINE SODIUM 112 MCG: 0.11 TABLET ORAL at 06:01

## 2024-01-31 RX ADMIN — SODIUM CHLORIDE 25 MG: 9 INJECTION, SOLUTION INTRAVENOUS at 17:19

## 2024-01-31 RX ADMIN — SODIUM CHLORIDE: 9 INJECTION, SOLUTION INTRAVENOUS at 12:01

## 2024-01-31 RX ADMIN — SODIUM BICARBONATE 650 MG: 650 TABLET ORAL at 20:37

## 2024-01-31 RX ADMIN — SODIUM BICARBONATE 650 MG: 650 TABLET ORAL at 13:10

## 2024-01-31 RX ADMIN — SODIUM CHLORIDE 500 MG: 9 INJECTION, SOLUTION INTRAVENOUS at 17:55

## 2024-01-31 RX ADMIN — MIDAZOLAM HYDROCHLORIDE 1 MG: 1 INJECTION, SOLUTION INTRAMUSCULAR; INTRAVENOUS at 09:36

## 2024-01-31 RX ADMIN — MIDAZOLAM HYDROCHLORIDE 1 MG: 1 INJECTION, SOLUTION INTRAMUSCULAR; INTRAVENOUS at 09:32

## 2024-01-31 RX ADMIN — SODIUM CHLORIDE 125 ML/HR: 9 INJECTION, SOLUTION INTRAVENOUS at 09:23

## 2024-01-31 RX ADMIN — MIDAZOLAM HYDROCHLORIDE 1 MG: 1 INJECTION, SOLUTION INTRAMUSCULAR; INTRAVENOUS at 09:33

## 2024-01-31 RX ADMIN — DIPHENHYDRAMINE HYDROCHLORIDE 25 MG: 25 CAPSULE ORAL at 20:37

## 2024-01-31 RX ADMIN — IOPAMIDOL 90 ML: 755 INJECTION, SOLUTION INTRAVENOUS at 07:19

## 2024-01-31 RX ADMIN — LIDOCAINE HYDROCHLORIDE 3 ML: 10 INJECTION, SOLUTION EPIDURAL; INFILTRATION; INTRACAUDAL; PERINEURAL at 09:33

## 2024-01-31 RX ADMIN — TRAMADOL HYDROCHLORIDE 50 MG: 50 TABLET ORAL at 17:54

## 2024-01-31 ASSESSMENT — PAIN - FUNCTIONAL ASSESSMENT: PAIN_FUNCTIONAL_ASSESSMENT: NONE - DENIES PAIN

## 2024-01-31 ASSESSMENT — PAIN SCALES - GENERAL
PAINLEVEL_OUTOF10: 8
PAINLEVEL_OUTOF10: 0

## 2024-01-31 ASSESSMENT — PAIN DESCRIPTION - DESCRIPTORS: DESCRIPTORS: ACHING

## 2024-01-31 ASSESSMENT — PAIN DESCRIPTION - ORIENTATION: ORIENTATION: RIGHT

## 2024-01-31 ASSESSMENT — PAIN DESCRIPTION - LOCATION: LOCATION: GROIN

## 2024-01-31 NOTE — PROGRESS NOTES
0800: Patient arrives to angio/IR for renal angiogram with possible intervention with moderate sedation.     3 mg versed given intra op.    1015: Updated sawyer Annika and nimisha Leger via phone.     1130: TRANSFER - OUT REPORT:    Verbal report given to Chapito ORELLANA on Alyssa Panchal  being transferred to [facility] for routine post-op       Report consisted of patient's Situation, Background, Assessment and   Recommendations(SBAR).     Information from the following report(s) Surgery Report, Intake/Output, MAR, Med Rec Status, and Alarm Parameters was reviewed with the receiving nurse.           Lines:   Peripheral IV 01/30/24 Left;Lower Arm (Active)   Site Assessment Clean, dry & intact 01/30/24 2127   Line Status Infusing 01/30/24 2127   Line Care Connections checked and tightened 01/30/24 2127   Phlebitis Assessment No symptoms 01/30/24 2127   Infiltration Assessment 0 01/30/24 2127   Alcohol Cap Used Yes 01/30/24 2127   Dressing Status Clean, dry & intact 01/30/24 2127   Dressing Type Transparent 01/30/24 2127   Dressing Intervention New 01/30/24 1422        Opportunity for questions and clarification was provided.      Patient transported with:  Registered Nurse

## 2024-01-31 NOTE — PROGRESS NOTES
1. The patient has a history of hematuria and is in the process of this being worked up by urology.  2. As far as her cough is concerned, she will be treated symptomatically with Tessalon Perles for now.  I do not hear any obvious wheezing for now.  3. She does have a history of hypothyroidism and I will await the results of her TSH before giving her a new prescription to ensure that she is indeed euthyroid.

## 2024-01-31 NOTE — CONSULTS
HEMATOLOGY / ONCOLOGY    Alyssa Panchal 1985 Age: 39 y.o.  Date of service: 01/23/20   Location: [facility]  William Fernández MD    CHIEF COMPLAINT: .hematuria      ACTIVE PROBLEMS  Sickle cell trait  --R lower pole calyx filling defects  ----hematuria  Microcytic anemia  Family history of thrombosis  Hypothyroidism, s/p radioactive iodine  Idiopathic cough       INTERIM HISTORY AND ASSESSMENT  Ms. Panchal has hamaturia. She had this last year and then again since Christmas. She says every time she urinates, she passes blood. She came in to the hospital for cystoscopy but it was canceled because her hemoglobin was 6.1.     It is rare, but not unheard of for individuals with sickle cell trait as she does to have hematuria. This can be mild and benign with no findings on renal imaging, or it can be due to renal papillary necrosis with filling defects in the calyces. Another consideration is renal medullary carcinoma, a very rare cancer that occurs most frequently in young adults with sickle cell trait. To sort this out, I suggest an MRI to better image the R kidney and cytology of the R kidney effluent tomorrow during cystoscopy to see if she is shedding malignant cells in her urine. The CT findings could represent clots as suggested by the radiologist or could represent renal papillary necrosis, or medullary carcinoma. There is no true mass, described, so hopefully it is not cancer.    To help stop the bleeding, one could try some Amicar, but I would be reluctant to do this without making sure she does not have a hypercoagulable state inherited from her father who has had two PE's.    Finally, even though she has bled every day since Christmas, I don't think this is enough to bleeding to cause her to have a hemoglobin of 6.1. More likely, it has made her iron deficient. In 2019, her hemoglobin was 14.6 with an MCV of 97. In 11/22 it was 13.9 with and MCV of 95. On 1/22/24 it was 8.9 with an MCV of 87 and  yesterday, she was 6.1 hgb with MCV of 82. We can check her iron stores, but they will not be reliable because of her blood transfusion. However, a reticulocyte count will. In someone who is anemic from bleeding, the retics should be high. In someone who is anemic from iron deficiency, the retics will be low.      She has been on iron pills and still has microcytic anemia, so I will give her IV iron.         Recent Labs     01/30/24  1431 01/30/24  1606 01/31/24  0359   WBC 7.7  --  7.0   HGB 6.1*  --  8.4*     --  322   APTT  --  28.5  --         Past Medical History:   Diagnosis Date    Arthritis     Ill-defined condition     heart murmur, since age of 12    Scoliosis     Sickle cell trait (HCC)     Thyroid disease      Past Surgical History:   Procedure Laterality Date    ABDOMEN SURGERY      bowel obtruction    GYN      HERNIA REPAIR  3/22/16    Open umbilical hernia repair    PARTIAL HYSTERECTOMY (CERVIX NOT REMOVED)  11/21/2019     Medications and Allergies have been reviewed and updated in the [employer] system.    REVIEW OF SYSTEMS  Except as noted in the history and assessement above, all other systems are negative.     EXAMINATION   Vital signs were reviewed abnormalities discussed above.   General: Looks well  HEENT: conjunctiva clear; no jaundice    Cervical nodes: none palpable  Supraclavicular nodes: none palpable  Axillary nodes: none palpable  Inguinal nodes: none palpable  Lungs: clear  CV: RRR  Abd: soft and non-tender; no HSM; no masses  Skin: no significant rashes  Ext: Edema: none; Tenderness: none  Neuro/Psych:  Gait: NL  Speech: NL   Affect: NL  Cognition: NL     Social History     Tobacco Use    Smoking status: Never    Smokeless tobacco: Never   Substance Use Topics    Alcohol use: No      Family History   Problem Relation Age of Onset    Asthma Paternal Aunt     Diabetes Paternal Aunt     Cancer Paternal Grandmother     Asthma Sister     Cancer Maternal Uncle         liver cancer

## 2024-01-31 NOTE — CARE COORDINATION
Care Management Initial Assessment       RUR:6%  Readmission? No  1st IM letter given? N/A  1st  letter given: N/A    Plan for surgery 2/1/24 to stop bleeding.    TRANSITION OF CARE  RUR--6%  Disposition--Home with family assist.  Transport--Family    Patient's primary family contact: mother Annika Panchal [phone]    Reason for Admission:  Hematuria       Consults:     Hematology, urology, nutrition               Plan for utilizing home health:    None      PCP: First and Last name:  Silvano Lopez MD     Current Advanced Directive/Advance Care Plan: Full Code                   CM met with patient and partner Arsen with whom she lives. Patient lives in a  [facility]. Local family support includes mother, daughter. Patient was ambulatory prior to admission. Patient confirmed PCP, health insurance, and prescription coverage.   Previous home health :None  Previous IPR/ SNF rehab :None  DME : None     01/31/24 1427   Service Assessment   Patient Orientation Alert and Oriented   Cognition Alert   History Provided By Patient   Primary Caregiver Self   Accompan[facility]   Support Systems Spouse/Significant Other;Parent   Patient's Healthcare Decision Maker is: Legal Next of Kin   PCP Verified by CM Yes   Last Visit to PCP Within last 3 months   Prior Functional Level Independent in ADLs/IADLs   Current Functional Level Independent in ADLs/IADLs   Can patient return to prior living arrangement Yes   Ability to make needs known: Good   Family able to assist with home care needs: Yes   Would you like for me to discuss the discharge plan with any other family members/significant others, and if so, who? No   Financial Resources Medicaid   Community Resources None        RIMA

## 2024-01-31 NOTE — PROGRESS NOTES
Comprehensive Nutrition Assessment    Type and Reason for Visit: Initial (Low BMI)    Nutrition Recommendations/Plan:   Continue with Regular diet  Will order vanilla Ensure Enlive TID       Malnutrition Assessment:  Malnutrition Status:  Severe malnutrition (01/31/24 1318)    Context:  Chronic Illness     Findings of the 6 clinical characteristics of malnutrition:  Energy Intake:  75% or less estimated energy requirements for 1 month or longer  Weight Loss:  Greater than 5% over 1 month     Body Fat Loss:  Severe body fat loss Orbital, Triceps   Muscle Mass Loss:  Severe muscle mass loss Temples (temporalis), Clavicles (pectoralis & deltoids)  Fluid Accumulation:  No significant fluid accumulation     Strength:  Not Performed       Nutrition Assessment:    38 yo female admitted for anemia.  PMHx: sickle cell, hypothyroid.    Seeing pt for BMI of 15.9.  Spoke with pt at bedside.  She states she is starving due to being NPO.  Diet was just ordered as Regular, previously NPO due to angiogram today. She reports very good appetite at home, she eats every 20 minutes and struggles to keep weight on due to her thyroid.  She requested vanilla Ensure with meals as she likes these.  Offered additional ONS but she declined.  States she has lost from 118 lbs down to 111 lbs since Alessio due to bleeding.  This is 6% loss over one month which is significant for time frame.  Weight hx in EMR indicates chronic low BMI over last 3 years.      Labs reviewed.      Nutritionally Significant Medications:  Synthroid, Protonix      Estimated Daily Nutrient Needs:  Energy Requirements Based On: Kcal/kg  Weight Used for Energy Requirements: Current  Energy (kcal/day): 2000 (40 kcals/kg)  Weight Used for Protein Requirements: Current  Protein (g/day): 100 (2 gm/kg)  Method Used for Fluid Requirements: 1 ml/kcal  Fluid (ml/day):      Nutrition Related Findings:   Edema: None                    Last BM:      Wounds:   Wound Type:

## 2024-01-31 NOTE — PROGRESS NOTES
Unfortunately ct not done last night   Will expedite this am  Hgb with good response    Bleeding has been on right side with past diagnostic ureteroscopes ?upper pole  Patient discussed with IR yesterday possible angiogram today

## 2024-01-31 NOTE — PROGRESS NOTES
Spiritual Care Partner Volunteer visited patient at [facility] in [facility] 5E2 SURGICAL UNIT on 1/31/2024   Documented by:  Jake Babin MDIV, BCC

## 2024-02-01 ENCOUNTER — APPOINTMENT (OUTPATIENT)
Facility: HOSPITAL | Age: 39
DRG: 443 | End: 2024-02-01
Attending: STUDENT IN AN ORGANIZED HEALTH CARE EDUCATION/TRAINING PROGRAM
Payer: COMMERCIAL

## 2024-02-01 ENCOUNTER — ANESTHESIA (OUTPATIENT)
Facility: HOSPITAL | Age: 39
DRG: 443 | End: 2024-02-01
Payer: COMMERCIAL

## 2024-02-01 ENCOUNTER — ANESTHESIA EVENT (OUTPATIENT)
Facility: HOSPITAL | Age: 39
DRG: 443 | End: 2024-02-01
Payer: COMMERCIAL

## 2024-02-01 PROBLEM — T14.8XXA HEMATOMA: Status: ACTIVE | Noted: 2024-02-01

## 2024-02-01 LAB
ERYTHROCYTE [DISTWIDTH] IN BLOOD BY AUTOMATED COUNT: 15.2 % (ref 11.5–14.5)
HCT VFR BLD AUTO: 28.3 % (ref 35–47)
HGB BLD-MCNC: 9 G/DL (ref 11.5–16)
MCH RBC QN AUTO: 27.8 PG (ref 26–34)
MCHC RBC AUTO-ENTMCNC: 31.8 G/DL (ref 30–36.5)
MCV RBC AUTO: 87.3 FL (ref 80–99)
NRBC # BLD: 0.02 K/UL (ref 0–0.01)
NRBC BLD-RTO: 0.2 PER 100 WBC
PLATELET # BLD AUTO: 356 K/UL (ref 150–400)
PMV BLD AUTO: 9.5 FL (ref 8.9–12.9)
RBC # BLD AUTO: 3.24 M/UL (ref 3.8–5.2)
WBC # BLD AUTO: 11.1 K/UL (ref 3.6–11)

## 2024-02-01 PROCEDURE — C2617 STENT, NON-COR, TEM W/O DEL: HCPCS | Performed by: STUDENT IN AN ORGANIZED HEALTH CARE EDUCATION/TRAINING PROGRAM

## 2024-02-01 PROCEDURE — G0378 HOSPITAL OBSERVATION PER HR: HCPCS

## 2024-02-01 PROCEDURE — C1758 CATHETER, URETERAL: HCPCS | Performed by: STUDENT IN AN ORGANIZED HEALTH CARE EDUCATION/TRAINING PROGRAM

## 2024-02-01 PROCEDURE — 0TCB8ZZ EXTIRPATION OF MATTER FROM BLADDER, VIA NATURAL OR ARTIFICIAL OPENING ENDOSCOPIC: ICD-10-PCS | Performed by: STUDENT IN AN ORGANIZED HEALTH CARE EDUCATION/TRAINING PROGRAM

## 2024-02-01 PROCEDURE — 2720000010 HC SURG SUPPLY STERILE: Performed by: STUDENT IN AN ORGANIZED HEALTH CARE EDUCATION/TRAINING PROGRAM

## 2024-02-01 PROCEDURE — 6360000002 HC RX W HCPCS: Performed by: ANESTHESIOLOGY

## 2024-02-01 PROCEDURE — 3700000001 HC ADD 15 MINUTES (ANESTHESIA): Performed by: STUDENT IN AN ORGANIZED HEALTH CARE EDUCATION/TRAINING PROGRAM

## 2024-02-01 PROCEDURE — 6370000000 HC RX 637 (ALT 250 FOR IP): Performed by: UROLOGY

## 2024-02-01 PROCEDURE — 6360000002 HC RX W HCPCS: Performed by: STUDENT IN AN ORGANIZED HEALTH CARE EDUCATION/TRAINING PROGRAM

## 2024-02-01 PROCEDURE — 85027 COMPLETE CBC AUTOMATED: CPT

## 2024-02-01 PROCEDURE — 6360000002 HC RX W HCPCS: Performed by: NURSE ANESTHETIST, CERTIFIED REGISTERED

## 2024-02-01 PROCEDURE — 1100000000 HC RM PRIVATE

## 2024-02-01 PROCEDURE — 7100000001 HC PACU RECOVERY - ADDTL 15 MIN: Performed by: STUDENT IN AN ORGANIZED HEALTH CARE EDUCATION/TRAINING PROGRAM

## 2024-02-01 PROCEDURE — 0T768DZ DILATION OF RIGHT URETER WITH INTRALUMINAL DEVICE, VIA NATURAL OR ARTIFICIAL OPENING ENDOSCOPIC: ICD-10-PCS | Performed by: STUDENT IN AN ORGANIZED HEALTH CARE EDUCATION/TRAINING PROGRAM

## 2024-02-01 PROCEDURE — 6370000000 HC RX 637 (ALT 250 FOR IP): Performed by: STUDENT IN AN ORGANIZED HEALTH CARE EDUCATION/TRAINING PROGRAM

## 2024-02-01 PROCEDURE — 3600000002 HC SURGERY LEVEL 2 BASE: Performed by: STUDENT IN AN ORGANIZED HEALTH CARE EDUCATION/TRAINING PROGRAM

## 2024-02-01 PROCEDURE — 0TC38ZZ EXTIRPATION OF MATTER FROM RIGHT KIDNEY PELVIS, VIA NATURAL OR ARTIFICIAL OPENING ENDOSCOPIC: ICD-10-PCS | Performed by: STUDENT IN AN ORGANIZED HEALTH CARE EDUCATION/TRAINING PROGRAM

## 2024-02-01 PROCEDURE — 7100000000 HC PACU RECOVERY - FIRST 15 MIN: Performed by: STUDENT IN AN ORGANIZED HEALTH CARE EDUCATION/TRAINING PROGRAM

## 2024-02-01 PROCEDURE — C1769 GUIDE WIRE: HCPCS | Performed by: STUDENT IN AN ORGANIZED HEALTH CARE EDUCATION/TRAINING PROGRAM

## 2024-02-01 PROCEDURE — 6370000000 HC RX 637 (ALT 250 FOR IP): Performed by: ANESTHESIOLOGY

## 2024-02-01 PROCEDURE — 2580000003 HC RX 258: Performed by: ANESTHESIOLOGY

## 2024-02-01 PROCEDURE — 3600000012 HC SURGERY LEVEL 2 ADDTL 15MIN: Performed by: STUDENT IN AN ORGANIZED HEALTH CARE EDUCATION/TRAINING PROGRAM

## 2024-02-01 PROCEDURE — 0T508ZZ DESTRUCTION OF RIGHT KIDNEY, VIA NATURAL OR ARTIFICIAL OPENING ENDOSCOPIC: ICD-10-PCS | Performed by: STUDENT IN AN ORGANIZED HEALTH CARE EDUCATION/TRAINING PROGRAM

## 2024-02-01 PROCEDURE — 93926 LOWER EXTREMITY STUDY: CPT

## 2024-02-01 PROCEDURE — BT1D1ZZ FLUOROSCOPY OF RIGHT KIDNEY, URETER AND BLADDER USING LOW OSMOLAR CONTRAST: ICD-10-PCS | Performed by: STUDENT IN AN ORGANIZED HEALTH CARE EDUCATION/TRAINING PROGRAM

## 2024-02-01 PROCEDURE — 74420 UROGRAPHY RTRGR +-KUB: CPT

## 2024-02-01 PROCEDURE — 36415 COLL VENOUS BLD VENIPUNCTURE: CPT

## 2024-02-01 PROCEDURE — 2709999900 HC NON-CHARGEABLE SUPPLY: Performed by: STUDENT IN AN ORGANIZED HEALTH CARE EDUCATION/TRAINING PROGRAM

## 2024-02-01 PROCEDURE — 2500000003 HC RX 250 WO HCPCS: Performed by: NURSE ANESTHETIST, CERTIFIED REGISTERED

## 2024-02-01 PROCEDURE — 3700000000 HC ANESTHESIA ATTENDED CARE: Performed by: STUDENT IN AN ORGANIZED HEALTH CARE EDUCATION/TRAINING PROGRAM

## 2024-02-01 DEVICE — URETERAL STENT
Type: IMPLANTABLE DEVICE | Site: BLADDER | Status: FUNCTIONAL
Brand: CONTOUR™

## 2024-02-01 RX ORDER — SODIUM CHLORIDE 9 MG/ML
INJECTION, SOLUTION INTRAVENOUS PRN
Status: DISCONTINUED | OUTPATIENT
Start: 2024-02-01 | End: 2024-02-01 | Stop reason: HOSPADM

## 2024-02-01 RX ORDER — ONDANSETRON 2 MG/ML
INJECTION INTRAMUSCULAR; INTRAVENOUS PRN
Status: DISCONTINUED | OUTPATIENT
Start: 2024-02-01 | End: 2024-02-01 | Stop reason: SDUPTHER

## 2024-02-01 RX ORDER — KETOROLAC TROMETHAMINE 30 MG/ML
30 INJECTION, SOLUTION INTRAMUSCULAR; INTRAVENOUS ONCE
Status: COMPLETED | OUTPATIENT
Start: 2024-02-01 | End: 2024-02-01

## 2024-02-01 RX ORDER — SODIUM CHLORIDE 0.9 % (FLUSH) 0.9 %
5-40 SYRINGE (ML) INJECTION PRN
Status: DISCONTINUED | OUTPATIENT
Start: 2024-02-01 | End: 2024-02-01 | Stop reason: HOSPADM

## 2024-02-01 RX ORDER — SODIUM CHLORIDE 0.9 % (FLUSH) 0.9 %
5-40 SYRINGE (ML) INJECTION EVERY 12 HOURS SCHEDULED
Status: DISCONTINUED | OUTPATIENT
Start: 2024-02-01 | End: 2024-02-01 | Stop reason: HOSPADM

## 2024-02-01 RX ORDER — ROCURONIUM BROMIDE 10 MG/ML
INJECTION, SOLUTION INTRAVENOUS PRN
Status: DISCONTINUED | OUTPATIENT
Start: 2024-02-01 | End: 2024-02-01 | Stop reason: SDUPTHER

## 2024-02-01 RX ORDER — FENTANYL CITRATE 50 UG/ML
INJECTION, SOLUTION INTRAMUSCULAR; INTRAVENOUS PRN
Status: DISCONTINUED | OUTPATIENT
Start: 2024-02-01 | End: 2024-02-01 | Stop reason: SDUPTHER

## 2024-02-01 RX ORDER — ONDANSETRON 2 MG/ML
4 INJECTION INTRAMUSCULAR; INTRAVENOUS
Status: DISCONTINUED | OUTPATIENT
Start: 2024-02-01 | End: 2024-02-01 | Stop reason: HOSPADM

## 2024-02-01 RX ORDER — FENTANYL CITRATE 50 UG/ML
25 INJECTION, SOLUTION INTRAMUSCULAR; INTRAVENOUS EVERY 5 MIN PRN
Status: DISCONTINUED | OUTPATIENT
Start: 2024-02-01 | End: 2024-02-01 | Stop reason: HOSPADM

## 2024-02-01 RX ORDER — LIDOCAINE HYDROCHLORIDE 20 MG/ML
INJECTION, SOLUTION EPIDURAL; INFILTRATION; INTRACAUDAL; PERINEURAL PRN
Status: DISCONTINUED | OUTPATIENT
Start: 2024-02-01 | End: 2024-02-01 | Stop reason: SDUPTHER

## 2024-02-01 RX ORDER — FENTANYL CITRATE 50 UG/ML
100 INJECTION, SOLUTION INTRAMUSCULAR; INTRAVENOUS
Status: DISCONTINUED | OUTPATIENT
Start: 2024-02-01 | End: 2024-02-01 | Stop reason: HOSPADM

## 2024-02-01 RX ORDER — HYDROMORPHONE HYDROCHLORIDE 1 MG/ML
0.5 INJECTION, SOLUTION INTRAMUSCULAR; INTRAVENOUS; SUBCUTANEOUS EVERY 5 MIN PRN
Status: DISCONTINUED | OUTPATIENT
Start: 2024-02-01 | End: 2024-02-01 | Stop reason: HOSPADM

## 2024-02-01 RX ORDER — PROCHLORPERAZINE EDISYLATE 5 MG/ML
5 INJECTION INTRAMUSCULAR; INTRAVENOUS
Status: DISCONTINUED | OUTPATIENT
Start: 2024-02-01 | End: 2024-02-01 | Stop reason: HOSPADM

## 2024-02-01 RX ORDER — LIDOCAINE HYDROCHLORIDE 10 MG/ML
1 INJECTION, SOLUTION EPIDURAL; INFILTRATION; INTRACAUDAL; PERINEURAL
Status: DISCONTINUED | OUTPATIENT
Start: 2024-02-01 | End: 2024-02-01 | Stop reason: HOSPADM

## 2024-02-01 RX ORDER — MIDAZOLAM HYDROCHLORIDE 1 MG/ML
INJECTION INTRAMUSCULAR; INTRAVENOUS PRN
Status: DISCONTINUED | OUTPATIENT
Start: 2024-02-01 | End: 2024-02-01 | Stop reason: SDUPTHER

## 2024-02-01 RX ORDER — CEFAZOLIN SODIUM 1 G/3ML
INJECTION, POWDER, FOR SOLUTION INTRAMUSCULAR; INTRAVENOUS PRN
Status: DISCONTINUED | OUTPATIENT
Start: 2024-02-01 | End: 2024-02-01 | Stop reason: SDUPTHER

## 2024-02-01 RX ORDER — ACETAMINOPHEN 500 MG
1000 TABLET ORAL ONCE
Status: COMPLETED | OUTPATIENT
Start: 2024-02-01 | End: 2024-02-01

## 2024-02-01 RX ORDER — MIDAZOLAM HYDROCHLORIDE 2 MG/2ML
2 INJECTION, SOLUTION INTRAMUSCULAR; INTRAVENOUS
Status: DISCONTINUED | OUTPATIENT
Start: 2024-02-01 | End: 2024-02-01 | Stop reason: HOSPADM

## 2024-02-01 RX ORDER — HYDRALAZINE HYDROCHLORIDE 20 MG/ML
10 INJECTION INTRAMUSCULAR; INTRAVENOUS
Status: DISCONTINUED | OUTPATIENT
Start: 2024-02-01 | End: 2024-02-01 | Stop reason: HOSPADM

## 2024-02-01 RX ORDER — DEXAMETHASONE SODIUM PHOSPHATE 4 MG/ML
INJECTION, SOLUTION INTRA-ARTICULAR; INTRALESIONAL; INTRAMUSCULAR; INTRAVENOUS; SOFT TISSUE PRN
Status: DISCONTINUED | OUTPATIENT
Start: 2024-02-01 | End: 2024-02-01 | Stop reason: SDUPTHER

## 2024-02-01 RX ORDER — SODIUM CHLORIDE, SODIUM LACTATE, POTASSIUM CHLORIDE, CALCIUM CHLORIDE 600; 310; 30; 20 MG/100ML; MG/100ML; MG/100ML; MG/100ML
INJECTION, SOLUTION INTRAVENOUS CONTINUOUS
Status: DISCONTINUED | OUTPATIENT
Start: 2024-02-01 | End: 2024-02-01 | Stop reason: HOSPADM

## 2024-02-01 RX ADMIN — DIPHENHYDRAMINE HYDROCHLORIDE 25 MG: 25 CAPSULE ORAL at 09:23

## 2024-02-01 RX ADMIN — ONDANSETRON 4 MG: 2 INJECTION INTRAMUSCULAR; INTRAVENOUS at 15:11

## 2024-02-01 RX ADMIN — SODIUM CHLORIDE, POTASSIUM CHLORIDE, SODIUM LACTATE AND CALCIUM CHLORIDE: 600; 310; 30; 20 INJECTION, SOLUTION INTRAVENOUS at 13:40

## 2024-02-01 RX ADMIN — MIDAZOLAM HYDROCHLORIDE 2 MG: 1 INJECTION INTRAMUSCULAR; INTRAVENOUS at 15:04

## 2024-02-01 RX ADMIN — FENTANYL CITRATE 50 MCG: 50 INJECTION, SOLUTION INTRAMUSCULAR; INTRAVENOUS at 15:07

## 2024-02-01 RX ADMIN — ONDANSETRON 4 MG: 2 INJECTION INTRAMUSCULAR; INTRAVENOUS at 13:03

## 2024-02-01 RX ADMIN — CEFAZOLIN SODIUM 2 G: 1 INJECTION, POWDER, FOR SOLUTION INTRAMUSCULAR; INTRAVENOUS at 15:17

## 2024-02-01 RX ADMIN — SODIUM BICARBONATE 650 MG: 650 TABLET ORAL at 08:22

## 2024-02-01 RX ADMIN — DEXAMETHASONE SODIUM PHOSPHATE 4 MG: 4 INJECTION, SOLUTION INTRA-ARTICULAR; INTRALESIONAL; INTRAMUSCULAR; INTRAVENOUS; SOFT TISSUE at 15:11

## 2024-02-01 RX ADMIN — FENTANYL CITRATE 25 MCG: 50 INJECTION INTRAMUSCULAR; INTRAVENOUS at 17:15

## 2024-02-01 RX ADMIN — FENTANYL CITRATE 50 MCG: 50 INJECTION, SOLUTION INTRAMUSCULAR; INTRAVENOUS at 16:26

## 2024-02-01 RX ADMIN — FENTANYL CITRATE 25 MCG: 50 INJECTION INTRAMUSCULAR; INTRAVENOUS at 17:05

## 2024-02-01 RX ADMIN — SODIUM BICARBONATE 650 MG: 650 TABLET ORAL at 21:09

## 2024-02-01 RX ADMIN — HYDROCODONE BITARTRATE AND ACETAMINOPHEN 1 TABLET: 5; 325 TABLET ORAL at 01:56

## 2024-02-01 RX ADMIN — LEVOTHYROXINE SODIUM 112 MCG: 0.11 TABLET ORAL at 06:35

## 2024-02-01 RX ADMIN — ROCURONIUM BROMIDE 10 MG: 10 INJECTION, SOLUTION INTRAVENOUS at 15:07

## 2024-02-01 RX ADMIN — HYDROCODONE BITARTRATE AND ACETAMINOPHEN 1 TABLET: 5; 325 TABLET ORAL at 21:09

## 2024-02-01 RX ADMIN — LIDOCAINE HYDROCHLORIDE 50 MG: 20 INJECTION, SOLUTION EPIDURAL; INFILTRATION; INTRACAUDAL; PERINEURAL at 15:07

## 2024-02-01 RX ADMIN — ACETAMINOPHEN 1000 MG: 500 TABLET ORAL at 13:42

## 2024-02-01 RX ADMIN — MIDAZOLAM HYDROCHLORIDE 3 MG: 1 INJECTION INTRAMUSCULAR; INTRAVENOUS at 14:58

## 2024-02-01 RX ADMIN — KETOROLAC TROMETHAMINE 30 MG: 30 INJECTION INTRAMUSCULAR; INTRAVENOUS at 17:59

## 2024-02-01 RX ADMIN — FLUTICASONE PROPIONATE 2 SPRAY: 50 SPRAY, METERED NASAL at 08:22

## 2024-02-01 ASSESSMENT — PAIN SCALES - GENERAL
PAINLEVEL_OUTOF10: 9
PAINLEVEL_OUTOF10: 7
PAINLEVEL_OUTOF10: 10
PAINLEVEL_OUTOF10: 10
PAINLEVEL_OUTOF10: 2
PAINLEVEL_OUTOF10: 3

## 2024-02-01 ASSESSMENT — PAIN DESCRIPTION - ORIENTATION
ORIENTATION: RIGHT
ORIENTATION: RIGHT;LEFT
ORIENTATION: RIGHT

## 2024-02-01 ASSESSMENT — PAIN DESCRIPTION - DESCRIPTORS
DESCRIPTORS: SHARP;SHOOTING
DESCRIPTORS: ACHING

## 2024-02-01 ASSESSMENT — PAIN SCALES - WONG BAKER
WONGBAKER_NUMERICALRESPONSE: 0
WONGBAKER_NUMERICALRESPONSE: NO HURT

## 2024-02-01 ASSESSMENT — PAIN DESCRIPTION - LOCATION
LOCATION: ABDOMEN
LOCATION: BACK;FLANK
LOCATION: BACK;FLANK
LOCATION: GROIN
LOCATION: ABDOMEN;GROIN

## 2024-02-01 NOTE — PROGRESS NOTES
0750-PT calling out stating that she feels extremely dizzy, almost to the point of passing out, nauseous, and jittery.  Vitals signs checked and WNL.  Paged hematology/onclogy MD.  MD stated to put reaction in chart to Iron infusion.  No new orders placed. Paged Urology MD and informed them of reaction to infusion. No new orders placed.  Pt provided with PRN Benadryl.      0050-RN entered room to find pt limping severely back from bathroom and complaining of increased pain at R groin site.  RN assisted pt back to bed and assessed groin site.  R groin site was 4 cm wide and slightly elevated and firm.  When palpating site pt complaining of increased pain.      0100-Per orders paged on call IR MD Aguilar and received orders for STAT vascular duplex of R groin site and to hold pressure for 30 minutes.    0130-RN outlined hematoma, held pressure at groin site, obtained lab work and restarted new IV.    0330-Stat duplex has not been completed.  Attempting to call vascular tech.  Perfect served went unanswered.  Finally reached tech at 0430 and is on the way to the hospital to complete duplex.      0540-Duplex of R groin obtained.

## 2024-02-01 NOTE — BRIEF OP NOTE
Brief Postoperative Note      Patient: Alyssa Panchal  YOB: 1985  MRN: [id number]    Date of Procedure: 2/1/2024    Pre-Op Diagnosis Codes:     * Bleeding [R58]    Post-Op Diagnosis: Same       Procedure(s):  RIGHT DIAGNOSTIC URETEROSCOPY  Basket extraction of clot  Laser ablation (light touch) of renal tissue  Retrograde pyelogram  Placement of 7 x 28 double j ureteral stent with string tucked into vagina    Surgeon(s):  James Barrera III, MD    Assistant:  * No surgical staff found *    Anesthesia: General    Estimated Blood Loss (mL): Minimal    Complications: None    Specimens:   * No specimens in log *    Implants:  Implant Name Type Inv. Item Serial No.  Lot No. LRB No. Used Action   STENT URET 7FR L28CM PERCFLX HYDR+ TAPR TIP GRAD - [id number]  STENT URET 7FR L28CM PERCFLX HYDR+ TAPR TIP GRAD  [employer] [id number] Right 1 Implanted         Drains: * No LDAs found *    Findings: clot in bladder and right renal pelvis clear left efflux  Some abnormal appearing blood vessels in upper and interpolar calyxes these were lasered with light touch  No mass seen  No active bleeding      Electronically signed by James Barrera III, MD on 2/1/2024 at 4:14 PM

## 2024-02-01 NOTE — PROGRESS NOTES
Patient: Alyssa Panchal MRN: [id number]  SSN: [id number]    YOB: 1985  Age: 39 y.o.  Sex: female        ADMITTED: 2024 to James Barrera III, MD by James Barrera III, MD for Bleeding [R58]  Anemia [D64.9]  POD# 2 Days Post-Op Procedure(s):  BILATERAL CYSTOSCOPY AND URETEROSCOPY    Alyssa Panchal is doing fair this morning .  Events reviewed overnight with primary nurse.  Apparently Patient was receiving IV iron which she did recently and then she began having fine raised rash all over her arm associated with it.  IV iron was discontinued.  And then overnight patient developed a right groin hematoma from her angio site.  IR was notified patient received a vascular duplex of her right groin which showed no vascular abnormality to explain hematoma.  This morning on exam hematoma has resolved the site is tender to touch but no ecchymosis or erythema noted.  Patient is voiding without difficulty    Vitals: Temp (24hrs), Av.4 °F (36.9 °C), Min:98.2 °F (36.8 °C), Max:98.6 °F (37 °C)    Blood pressure 110/74, pulse 83, temperature 98.6 °F (37 °C), temperature source Oral, resp. rate 20, height 1.778 m (5' 10\"), weight 50.3 kg (111 lb), SpO2 100 %.    Intake and Output:   1901 -  0700  In: 385.5   Out: -   No intake/output data recorded.  MARILIA Output lats 24 hrs: No data found.   MARILIA Output last 8 hrs: No data found.  BM over last 24 hrs: No data found.    Exam:   Gen: NAD  CV: extremities well perfused  Lungs: nonlabored respirations. Symmetric chest expansion  Ext: no edema, R groin  hematoma - resolved   Abdomen: soft NTND no cvat   : voding      Labs:  CBC:   Lab Results   Component Value Date/Time    WBC 11.1 2024 02:15 AM    HCT 28.3 2024 02:15 AM     2024 02:15 AM     BMP:   Lab Results   Component Value Date/Time     2024 04:44 PM    K 3.6 2024 04:44 PM     2024 04:44 PM    CO2 25 2024 04:44 PM    BUN 9  in the colon. No evidence of colitis.  APPENDIX: Not seen  PERITONEUM: No ascites or pneumoperitoneum.  RETROPERITONEUM: No lymphadenopathy or aortic aneurysm.  BONES: No destructive bone lesion.  ABDOMINAL WALL: No mass or hernia.  ADDITIONAL COMMENTS: N/A     IMPRESSION:  Filling defects arising from the right lower pole calyx extending into the right  renal pelvis, on the delayed phase, suggestive of clots. No active  extravasation, pseudoaneurysm, or other vascular abnormality to explain  hematuria.  Punctate left renal stones.             Specimen Collected: 01/31/24 13:39 EST             Signed By: LISSTEH Green - NP - February 1, 2024

## 2024-02-01 NOTE — PERIOP NOTE
TRANSFER - OUT REPORT:    Verbal report given to Chapito ORELLANA on Alyssa Panchal  being transferred to room 520 for routine post-op       Report consisted of patient’s Situation, Background, Assessment and   Recommendations(SBAR).     Time Pre op antibiotic given:1517  Anesthesia Stop time: 1636    Information from the following report(s) SBAR and MAR was reviewed with the receiving nurse.    Opportunity for questions and clarification was provided.     Is the patient on 02? No       L/Min r/a       Other     Modi Present on Transfer to floor: No  Order for Modi on Chart: No    Is the patient on a monitor? No    Is the nurse transporting with the patient? No    Surgical Waiting Area notified of patient's transfer from PACU? No-in pt room    Lines:   Peripheral IV 02/01/24 Right;Ventral Forearm (Active)   Site Assessment Clean, dry & intact 02/01/24 1635   Line Status Infusing 02/01/24 1635   Line Care Connections checked and tightened;Cap changed 02/01/24 0822   Phlebitis Assessment No symptoms 02/01/24 1635   Infiltration Assessment 0 02/01/24 1635   Alcohol Cap Used Yes 02/01/24 0822   Dressing Status Clean, dry & intact 02/01/24 0822   Dressing Type Transparent 02/01/24 0822   Dressing Intervention New 02/01/24 0569        The following personal items collected during your admission accompanied patient upon transfer:   Dental Appliance:    Vision:    Hearing Aid:    Jewelry:    Clothing:    Other Valuables:    Valuables sent to safe:     VS stable. Resting comfortably. Patient denies nausea. Pain improved. No signs of excessive bleeding. Ready to transfer from PACU.

## 2024-02-01 NOTE — H&P
CC: Hematuria    HPI:  She was here for diagnostic ureteroscopy for hematuria  Hx of sct   Chronic on and off hematuria  Dizzy today  Preop hgb 6       ROS  Positive for dizziness   No chest pain nausea vomiting    Past Medical History:   Diagnosis Date    Arthritis     Ill-defined condition     heart murmur, since age of 12    Scoliosis     Sickle cell trait (HCC)     Thyroid disease      Past Surgical History:   Procedure Laterality Date    ABDOMEN SURGERY      bowel obtruction    GYN      HERNIA REPAIR  3/22/16    Open umbilical hernia repair    PARTIAL HYSTERECTOMY (CERVIX NOT REMOVED)  11/21/2019     Social History     Socioeconomic History    Marital status: Single     Spouse name: None    Number of children: None    Years of education: None    Highest education level: None   Tobacco Use    Smoking status: Never    Smokeless tobacco: Never   Substance and Sexual Activity    Alcohol use: No    Drug use: No     Social Determinants of Health     Financial Resource Strain: Low Risk  (1/22/2024)    Overall Financial Resource Strain (CARDIA)     Difficulty of Paying Living Expenses: Not hard at all   Food Insecurity: No Food Insecurity (1/22/2024)    Hunger Vital Sign     Worried About Running Out of Food in the Last Year: Never true     Ran Out of Food in the Last Year: Never true   Transportation Needs: Unknown (1/22/2024)    PRAPARE - Transportation     Lack of Transportation (Non-Medical): No   Housing Stability: Unknown (1/22/2024)    Housing Stability Vital Sign     Unstable Housing in the Last Year: No     Vitals:    02/01/24 1333   BP: 104/73   Pulse: 81   Resp: 16   Temp: 98.5 °F (36.9 °C)   SpO2: 100%     Nad  Warm well perfused  Breathing comf symmteric     A/p  Hx of right sided renal bleeding ?cause thought to be papillary necrosis   Hgb down   Symptomatic will admit give 2 uints  Consult hematology  Reschedule procedure  Also discuss arteriogram with IR to see if embolization would be feasible

## 2024-02-01 NOTE — ANESTHESIA PRE PROCEDURE
Department of Anesthesiology  Preprocedure Note       Name:  Alyssa Panchal   Age:  39 y.o.  :  1985                                          MRN:  [id number]         Date:  2024      Surgeon: Surgeon(s):  James Barrera III, MD    Procedure: Procedure(s):  RIGHT DIAGNOSTIC URETEROSCOPY    Medications prior to admission:   Prior to Admission medications    Medication Sig Start Date End Date Taking? Authorizing Provider   VITAMIN D, ERGOCALCIFEROL, PO Take 500 Units by mouth   Yes Toney Sanchez MD   levothyroxine (SYNTHROID) 100 MCG tablet Take 1 tablet by mouth daily 24   Silvano Lopez MD   traMADol (ULTRAM) 50 MG tablet Take 1 tablet by mouth every 6 hours as needed for Pain. 24   Toney Sanchez MD   clindamycin (CLEOCIN) 2 % vaginal cream Place 1 applicator vaginally nightly    Toney Sanchez MD   ciprofloxacin (CIPRO) 500 MG tablet Take 1 tablet by mouth 2 times daily 1/3/24   Toney Sanchez MD   benzonatate (TESSALON) 100 MG capsule Take 1 capsule by mouth 3 times daily as needed for Cough 24  Silvano Lopez MD   albuterol sulfate HFA (PROVENTIL;VENTOLIN;PROAIR) 108 (90 Base) MCG/ACT inhaler Inhale 2 puffs into the lungs every 4 hours as needed 22   Automatic Reconciliation, Ar   fluticasone (FLONASE) 50 MCG/ACT nasal spray 2 sprays in each nostril daily 22   Automatic Reconciliation, Ar   nitrofurantoin, macrocrystal-monohydrate, (MACROBID) 100 MG capsule nitrofurantoin monohydrate/macrocrystals 100 mg capsule   TAKE 1 CAPSULE BY MOUTH EVERY 12 HOURS FOR 5 DAYS    Automatic Reconciliation, Ar   omeprazole (PRILOSEC) 40 MG delayed release capsule Take 1 capsule by mouth daily 11/10/22   Automatic Reconciliation, Ar       Current medications:    Current Facility-Administered Medications   Medication Dose Route Frequency Provider Last Rate Last Admin    lidocaine PF 1 % injection 1 mL  1 mL IntraDERmal Once PRN Ryan Thakur MD         ABGs: No results found for: \"PHART\", \"PO2ART\", \"FIY6SIU\", \"NDW9AAC\", \"BEART\", \"C9OXCBRW\"     Type & Screen (If Applicable):  No results found for: \"LABABO\", \"LABRH\"    Drug/Infectious Status (If Applicable):  Lab Results   Component Value Date/Time    HEPCAB <0.1 12/17/2019 04:27 PM       COVID-19 Screening (If Applicable):   Lab Results   Component Value Date/Time    COVID19 Not Detected 05/25/2020 01:25 PM           Anesthesia Evaluation  Patient summary reviewed and Nursing notes reviewed   no history of anesthetic complications:   Airway: Mallampati: III  TM distance: >3 FB   Neck ROM: full  Mouth opening: > = 3 FB   Dental: normal exam   (+) caps      Pulmonary:Negative Pulmonary ROS and normal exam  breath sounds clear to auscultation  (+)           asthma:                            Cardiovascular:Negative CV ROS  Exercise tolerance: poor (<4 METS)          Rhythm: regular  Rate: normal                    Neuro/Psych:   Negative Neuro/Psych ROS              GI/Hepatic/Renal: Neg GI/Hepatic/Renal ROS  (+) renal disease: kidney stones          Endo/Other: Negative Endo/Other ROS   (+) hypothyroidism, blood dyscrasia: anemia:..                 Abdominal: normal exam            Vascular: negative vascular ROS.         Other Findings:             Anesthesia Plan      general     ASA 3       Induction: intravenous.    MIPS: Postoperative opioids intended and Prophylactic antiemetics administered.  Anesthetic plan and risks discussed with patient.    Use of blood products discussed with patient whom consented to blood products.    Plan discussed with CRNA and surgical team.    Attending anesthesiologist reviewed and agrees with Preprocedure content                Ryan Thakur MD   2/1/2024

## 2024-02-02 ENCOUNTER — APPOINTMENT (OUTPATIENT)
Facility: HOSPITAL | Age: 39
DRG: 443 | End: 2024-02-02
Attending: STUDENT IN AN ORGANIZED HEALTH CARE EDUCATION/TRAINING PROGRAM
Payer: COMMERCIAL

## 2024-02-02 LAB
ANION GAP SERPL CALC-SCNC: 3 MMOL/L (ref 5–15)
BUN SERPL-MCNC: 11 MG/DL (ref 6–20)
BUN/CREAT SERPL: 15 (ref 12–20)
CALCIUM SERPL-MCNC: 9 MG/DL (ref 8.5–10.1)
CHLORIDE SERPL-SCNC: 113 MMOL/L (ref 97–108)
CO2 SERPL-SCNC: 23 MMOL/L (ref 21–32)
CREAT SERPL-MCNC: 0.75 MG/DL (ref 0.55–1.02)
ECHO BSA: 1.58 M2
GLUCOSE SERPL-MCNC: 89 MG/DL (ref 65–100)
HCT VFR BLD AUTO: 25.8 % (ref 35–47)
HGB BLD-MCNC: 8.6 G/DL (ref 11.5–16)
POTASSIUM SERPL-SCNC: 4 MMOL/L (ref 3.5–5.1)
SODIUM SERPL-SCNC: 139 MMOL/L (ref 136–145)

## 2024-02-02 PROCEDURE — 85018 HEMOGLOBIN: CPT

## 2024-02-02 PROCEDURE — 6360000002 HC RX W HCPCS: Performed by: NURSE PRACTITIONER

## 2024-02-02 PROCEDURE — 36415 COLL VENOUS BLD VENIPUNCTURE: CPT

## 2024-02-02 PROCEDURE — 1100000000 HC RM PRIVATE

## 2024-02-02 PROCEDURE — 96376 TX/PRO/DX INJ SAME DRUG ADON: CPT

## 2024-02-02 PROCEDURE — 96375 TX/PRO/DX INJ NEW DRUG ADDON: CPT

## 2024-02-02 PROCEDURE — 6360000002 HC RX W HCPCS: Performed by: STUDENT IN AN ORGANIZED HEALTH CARE EDUCATION/TRAINING PROGRAM

## 2024-02-02 PROCEDURE — 6370000000 HC RX 637 (ALT 250 FOR IP): Performed by: NURSE PRACTITIONER

## 2024-02-02 PROCEDURE — 6370000000 HC RX 637 (ALT 250 FOR IP): Performed by: STUDENT IN AN ORGANIZED HEALTH CARE EDUCATION/TRAINING PROGRAM

## 2024-02-02 PROCEDURE — G0378 HOSPITAL OBSERVATION PER HR: HCPCS

## 2024-02-02 PROCEDURE — 6360000002 HC RX W HCPCS: Performed by: INTERNAL MEDICINE

## 2024-02-02 PROCEDURE — 85014 HEMATOCRIT: CPT

## 2024-02-02 PROCEDURE — 76770 US EXAM ABDO BACK WALL COMP: CPT

## 2024-02-02 PROCEDURE — 80048 BASIC METABOLIC PNL TOTAL CA: CPT

## 2024-02-02 RX ORDER — FOLIC ACID 1 MG/1
1 TABLET ORAL DAILY
Qty: 30 TABLET | Refills: 3 | Status: SHIPPED | OUTPATIENT
Start: 2024-02-03

## 2024-02-02 RX ORDER — OXYBUTYNIN CHLORIDE 5 MG/1
5 TABLET ORAL 3 TIMES DAILY PRN
Status: DISCONTINUED | OUTPATIENT
Start: 2024-02-02 | End: 2024-02-03 | Stop reason: HOSPADM

## 2024-02-02 RX ORDER — PROCHLORPERAZINE EDISYLATE 5 MG/ML
10 INJECTION INTRAMUSCULAR; INTRAVENOUS EVERY 6 HOURS PRN
Status: DISCONTINUED | OUTPATIENT
Start: 2024-02-02 | End: 2024-02-03 | Stop reason: HOSPADM

## 2024-02-02 RX ORDER — TAMSULOSIN HYDROCHLORIDE 0.4 MG/1
0.4 CAPSULE ORAL DAILY
Qty: 3 CAPSULE | Refills: 0 | Status: SHIPPED | OUTPATIENT
Start: 2024-02-03 | End: 2024-02-06

## 2024-02-02 RX ORDER — LANOLIN ALCOHOL/MO/W.PET/CERES
1 CREAM (GRAM) TOPICAL DAILY
Status: DISCONTINUED | OUTPATIENT
Start: 2024-02-02 | End: 2024-02-03 | Stop reason: HOSPADM

## 2024-02-02 RX ORDER — OXYBUTYNIN CHLORIDE 5 MG/1
5 TABLET ORAL 3 TIMES DAILY PRN
Qty: 9 TABLET | Refills: 0 | Status: SHIPPED | OUTPATIENT
Start: 2024-02-02 | End: 2024-02-05

## 2024-02-02 RX ORDER — HYDROMORPHONE HYDROCHLORIDE 1 MG/ML
0.5 INJECTION, SOLUTION INTRAMUSCULAR; INTRAVENOUS; SUBCUTANEOUS
Status: DISCONTINUED | OUTPATIENT
Start: 2024-02-02 | End: 2024-02-03 | Stop reason: HOSPADM

## 2024-02-02 RX ORDER — TAMSULOSIN HYDROCHLORIDE 0.4 MG/1
0.4 CAPSULE ORAL DAILY
Status: DISCONTINUED | OUTPATIENT
Start: 2024-02-02 | End: 2024-02-03 | Stop reason: HOSPADM

## 2024-02-02 RX ADMIN — TAMSULOSIN HYDROCHLORIDE 0.4 MG: 0.4 CAPSULE ORAL at 08:53

## 2024-02-02 RX ADMIN — SODIUM BICARBONATE 650 MG: 650 TABLET ORAL at 21:41

## 2024-02-02 RX ADMIN — SODIUM BICARBONATE 650 MG: 650 TABLET ORAL at 08:51

## 2024-02-02 RX ADMIN — HYDROMORPHONE HYDROCHLORIDE 0.5 MG: 1 INJECTION, SOLUTION INTRAMUSCULAR; INTRAVENOUS; SUBCUTANEOUS at 15:56

## 2024-02-02 RX ADMIN — OXYBUTYNIN CHLORIDE 5 MG: 5 TABLET ORAL at 13:53

## 2024-02-02 RX ADMIN — ONDANSETRON 4 MG: 2 INJECTION INTRAMUSCULAR; INTRAVENOUS at 12:38

## 2024-02-02 RX ADMIN — HYDROCODONE BITARTRATE AND ACETAMINOPHEN 1 TABLET: 5; 325 TABLET ORAL at 12:38

## 2024-02-02 RX ADMIN — PROCHLORPERAZINE EDISYLATE 10 MG: 5 INJECTION INTRAMUSCULAR; INTRAVENOUS at 15:56

## 2024-02-02 RX ADMIN — LEVOTHYROXINE SODIUM 112 MCG: 0.11 TABLET ORAL at 06:51

## 2024-02-02 ASSESSMENT — PAIN SCALES - GENERAL
PAINLEVEL_OUTOF10: 10
PAINLEVEL_OUTOF10: 7
PAINLEVEL_OUTOF10: 10

## 2024-02-02 ASSESSMENT — PAIN DESCRIPTION - DESCRIPTORS: DESCRIPTORS: ACHING

## 2024-02-02 ASSESSMENT — PAIN DESCRIPTION - LOCATION: LOCATION: GROIN

## 2024-02-02 ASSESSMENT — PAIN DESCRIPTION - ORIENTATION: ORIENTATION: ANTERIOR

## 2024-02-02 NOTE — PROGRESS NOTES
Progress Note    Patient: Alyssa Panchal MRN: [id number]  SSN: [id number]    YOB: 1985  Age: 39 y.o.  Sex: female          ADMITTED:  2024 to James Barrera III, MD  for Bleeding [R58]  Anemia [D64.9]           Alyssa Panchal is 1 Day Post-Op Procedure(s):  Bilateral cystoscopy and ureteroscopy with basket retrieval of mass, laser ablation, and right retrogrades.  She is doing well.   She has some RLQ and flank discomfort, cramping with urinary frequency. She reports some blood in her urine but significantly improved compared to prior.     AFVSS  Labs pending.       Vitals:  Temp (24hrs), Av.2 °F (36.8 °C), Min:98.1 °F (36.7 °C), Max:98.5 °F (36.9 °C)     Blood pressure 108/69, pulse 83, temperature 98.4 °F (36.9 °C), temperature source Oral, resp. rate 18, height 1.778 m (5' 10\"), weight 50.3 kg (110 lb 14.3 oz), SpO2 100 %.      I&O's:   1901 -  0700  In: 1000 [I.V.:1000]  Out: 145 [Urine:145]   No intake/output data recorded.     Exam:   Physical Exam  General: NAD, pleasant  Respiratory: no distress, breathing easily, room air  Abdomen: soft, no distention; RLQ tender to palpation  : Right CVA tenderness, voiding independently, no UA to assess  Neuro: Appropriate, no focal neurological deficits  Skin: warm, dry  Extremities: moves all, full ROM     Labs:   Recent Labs     24  0359 24  1632 24  0215   WBC 7.0 9.2 11.1*   HGB 8.4* 9.1* 9.0*   HCT 24.7* 28.1* 28.3*    360 356     No results for input(s): \"NA\", \"K\", \"CL\", \"CO2\", \"GLU\", \"BUN\", \"CREA\", \"CA\" in the last 72 hours.     Cultures:        Imaging:       Assessment:     - 1 Day Post-Op Procedure(s):  Bilateral cystoscopy and ureteroscopy with basket retrieval of mass, laser ablation, and right retrogrades    Principal Problem:    Anemia  Active Problems:    Hematoma  Resolved Problems:    * No resolved hospital problems. *      Plan:     - Awaiting

## 2024-02-02 NOTE — PROGRESS NOTES
Physician Progress Note      PATIENT:               KRISTINE BOSTON  CSN #:                  [id number]  :                       1985  ADMIT DATE:       2024 1:17 PM  DISCH DATE:  RESPONDING  PROVIDER #:        Jimbo Britt NP          QUERY TEXT:    Patient admitted with anemia. Noted to have documentation of weight loss and   dietician assessment with malnutrition diagnosis. If possible, please document   in progress notes and discharge summary if you are evaluating and /or   treating any of the following:      The medical record reflects the following:  Risk Factors: weight  loss  Clinical Indicators:   nutritional consult  Malnutrition Assessment:  Malnutrition Status:  Severe malnutrition (24 1318)  Context:  Chronic Illness  Findings of the 6 clinical characteristics of malnutrition:  Energy Intake:  75% or less estimated energy requirements for 1 month or   longer  Weight Loss:  Greater than 5% over 1 month  Body Fat Loss:  Severe body fat loss Orbital, Triceps  Muscle Mass Loss:  Severe muscle mass loss Temples (temporalis), Clavicles   (pectoralis & deltoids)  Fluid Accumulation:  No significant fluid accumulation   Strength:  Not Performed  Nutrition Diagnosis:  ? Underweight related to inadequate protein-energy intake as evidenced by BMI   (15.9)  ?      Treatment: Nutrition Interventions:  Food and/or Nutrient Delivery: Start Oral Diet, Start Oral Nutrition   Supplement  Nutrition Education/Counseling: No recommendation at this time  Coordination of Nutrition Care: Continue to monitor while inpatient    Thank you  Maia Hatch RN, CDI. CCDS, CRCR  Certified  Clinical Documentation   O: [phone]  [email or web address]  I can also be reached by perfect serve      ASPEN Criteria:    https://aspenjournals.onlinelibrary.kimble.com/doi/full/10.1177/480431786612737  5  Options provided:  -- Protein calorie malnutrition severe with BMI 15.93  kg/m?  -- Other - I will add my own diagnosis  -- Disagree - Not applicable / Not valid  -- Disagree - Clinically unable to determine / Unknown  -- Refer to Clinical Documentation Reviewer    PROVIDER RESPONSE TEXT:    This patient has severe protein calorie malnutrition with BMI 15.93 kg/m? .    Query created by: Maia Hatch on 2/1/2024 12:07 PM      Electronically signed by:  Jimbo Britt NP 2/2/2024 11:29 AM

## 2024-02-02 NOTE — PROGRESS NOTES
Hematology-Oncology Progress Note    Alyssa Panchal  1985  [id number]  2/2/2024    Follow-up for: sickle trait     [x]        Chart notes since last visit reviewed   [x]        Medications reviewed for allergies and interactions       Case discussed with the following:         []                            []        Nursing Staff                                                                         []        Pathologist                                                                        []        FAMILY      Subjective:     Spoke with patient who complains of: abd. Pain and nausea since stent placement    Objective:   Patient Vitals for the past 24 hrs:   BP Temp Temp src Pulse Resp SpO2   02/02/24 0832 108/69 98.4 °F (36.9 °C) Oral 83 18 100 %   02/01/24 2139 -- -- -- -- 18 --   02/01/24 2109 -- -- -- -- 18 --   02/01/24 1959 115/82 98.1 °F (36.7 °C) Oral 94 18 99 %   02/01/24 1815 105/65 98.1 °F (36.7 °C) Oral 83 16 99 %   02/01/24 1730 108/65 -- -- 67 17 --   02/01/24 1715 101/65 -- -- 80 23 --   02/01/24 1700 118/72 -- -- 87 28 100 %   02/01/24 1650 113/67 -- -- 82 25 99 %   02/01/24 1645 121/81 -- -- 85 29 98 %   02/01/24 1640 120/72 -- -- 89 16 97 %   02/01/24 1635 106/66 98.1 °F (36.7 °C) -- 95 16 98 %   02/01/24 1632 114/70 -- -- 88 15 97 %       REVIEW OF SYSTEMS:    Constitutional: negative fever, negative chills, negative weight loss  Eyes:   negative visual changes  ENT:   negative sore throat, tongue or lip swelling  Respiratory:  negative cough, negative dyspnea  Cards:  negative for chest pain, palpitations, lower extremity edema  GI:   negative for nausea, vomiting, diarrhea, and abdominal pain  Neuro:  negative for headaches, dizziness, vertigo  []                        Full ROS o/w normal/non contributor    Constitutional:  Patient looks  []        Sick  []        Frail  []        Better                                                 []        Depressed    HEENT:  [x]   NC   MG/DL       Available Xrays reviewed:    Chemotherapy monitored and toxicities assessed:    Assessment and Plan   Sickle trait.  Pt is s;p bilat cystoscopy and ureteroscopy with retrieval of hematoma + laser ablation and right retrograde\"    hgb is 8.6 today,,, stable,  rec. Continued iron /folate,,  pt is having some nausea and pain today.  I will order compazine prn ..   There are no contraindication for discharge our stand point, though pt.is uncomfortable and does not feel that she is ready for discharge.... when  she does  go home she will need to f/u with dr etienne in 1-2 months    MD Jung Chung MD  2/2/2024

## 2024-02-02 NOTE — DISCHARGE INSTRUCTIONS
DISCHARGE INFORMATION    Patient: Alyssa Panchal MRN: [id number]  SSN: [id number]    YOB: 1985  Age: 39 y.o.  Sex: female        Followup Plan:    Practice/Physician [facility] / Dr. Barrera                   As a part of your procedure you have a ureteral stent in place which maintains proper drainage of your kidney. It bypasses any blockage from a kidney stone, or swelling within the ureter. Most patients still experience some discomfort with the stent, but not to the extent which was seen previously.    Pain Medications  You may use over the counter Tylenol for pain.   You will also be prescribed a medication for bladder spasms and stent colic.         Expected Symptoms from Stent  The stent rubs, so some blood in the urine will be seen. Note that this amount of blood can appear significant but is in reality very little.  When urinating expect to have increased discomfort in the back on the side of the stent. This comes from urine going from the bladder, up the ureter/stent and causing pressure/pain in the kidney area.  The stent can be quite irritating to the bladder and may cause symptoms of frequent/urgent urination or a sudden severe pain over the bladder with the urge to urinate. This is a bladder spasm, and if they are common another medication can help.  Many times strings are left attatched to the stent and come out though the urethra. This allows your doctor to remove the stent when appropriate without another procedure. In men it is usually taped to the penis, or sometime free floating. In women it is tucked in the vagina. Leave this string alone. Avoid all sexual activity with the stent/string.  If you experience fever > 101, uncontrolled pain/nauea/vomiting, or have continuous leakage of urine please contact our office.

## 2024-02-02 NOTE — PROGRESS NOTES
Patient re-evaluated.   Patient developed severe right sided abd pain and flank pain late this morning after rounds that has not improved with PO narcotics.  Pain regimen adjusted. Flomax, oxybutynin for stent colic.    ARMANDO ordered, hx of dislodged stent with last stent   She will remain overnight for observation until pain better controlled.   Anticipated dc home tomorrow. Plan for stent removal on Monday.   Appreciate assistance from Hematology.

## 2024-02-03 VITALS
WEIGHT: 110.89 LBS | DIASTOLIC BLOOD PRESSURE: 58 MMHG | SYSTOLIC BLOOD PRESSURE: 104 MMHG | TEMPERATURE: 98.2 F | OXYGEN SATURATION: 100 % | HEIGHT: 70 IN | RESPIRATION RATE: 20 BRPM | HEART RATE: 84 BPM | BODY MASS INDEX: 15.88 KG/M2

## 2024-02-03 PROBLEM — D64.9 ANEMIA: Status: ACTIVE | Noted: 2017-01-24

## 2024-02-03 PROCEDURE — 6370000000 HC RX 637 (ALT 250 FOR IP): Performed by: STUDENT IN AN ORGANIZED HEALTH CARE EDUCATION/TRAINING PROGRAM

## 2024-02-03 PROCEDURE — 6370000000 HC RX 637 (ALT 250 FOR IP): Performed by: INTERNAL MEDICINE

## 2024-02-03 PROCEDURE — 96376 TX/PRO/DX INJ SAME DRUG ADON: CPT

## 2024-02-03 PROCEDURE — 6360000002 HC RX W HCPCS: Performed by: NURSE PRACTITIONER

## 2024-02-03 PROCEDURE — G0378 HOSPITAL OBSERVATION PER HR: HCPCS

## 2024-02-03 PROCEDURE — 6370000000 HC RX 637 (ALT 250 FOR IP): Performed by: NURSE PRACTITIONER

## 2024-02-03 RX ORDER — HYDROMORPHONE HYDROCHLORIDE 2 MG/1
2 TABLET ORAL EVERY 6 HOURS PRN
Qty: 12 TABLET | Refills: 0 | Status: SHIPPED | OUTPATIENT
Start: 2024-02-03 | End: 2024-02-06

## 2024-02-03 RX ADMIN — TAMSULOSIN HYDROCHLORIDE 0.4 MG: 0.4 CAPSULE ORAL at 08:54

## 2024-02-03 RX ADMIN — FLUTICASONE PROPIONATE 2 SPRAY: 50 SPRAY, METERED NASAL at 08:54

## 2024-02-03 RX ADMIN — HYDROMORPHONE HYDROCHLORIDE 0.5 MG: 1 INJECTION, SOLUTION INTRAMUSCULAR; INTRAVENOUS; SUBCUTANEOUS at 00:41

## 2024-02-03 RX ADMIN — PANTOPRAZOLE SODIUM 40 MG: 20 TABLET, DELAYED RELEASE ORAL at 08:55

## 2024-02-03 RX ADMIN — SODIUM BICARBONATE 650 MG: 650 TABLET ORAL at 08:54

## 2024-02-03 RX ADMIN — LEVOTHYROXINE SODIUM 112 MCG: 0.11 TABLET ORAL at 06:54

## 2024-02-03 RX ADMIN — FOLIC ACID TAB 400 MCG 1 MG: 400 TAB at 08:58

## 2024-02-03 ASSESSMENT — PAIN DESCRIPTION - LOCATION: LOCATION: ABDOMEN;BACK

## 2024-02-03 ASSESSMENT — PAIN SCALES - GENERAL
PAINLEVEL_OUTOF10: 10
PAINLEVEL_OUTOF10: 0

## 2024-02-03 ASSESSMENT — PAIN DESCRIPTION - DESCRIPTORS: DESCRIPTORS: STABBING

## 2024-02-03 ASSESSMENT — PAIN DESCRIPTION - ORIENTATION: ORIENTATION: RIGHT

## 2024-02-03 NOTE — PROGRESS NOTES
Hematology-Oncology Progress Note    Alyssa Panchal  1985  [id number]  2/3/2024    Follow-up for: sickle trait     [x]        Chart notes since last visit reviewed   [x]        Medications reviewed for allergies and interactions       Case discussed with the following:         []                            []        Nursing Staff                                                                         []        Pathologist                                                                        []        FAMILY      Subjective:     Spoke with patient who complains of: tired, but feeling better    Objective:   Patient Vitals for the past 24 hrs:   BP Temp Temp src Pulse Resp SpO2   02/03/24 0819 92/60 98.2 °F (36.8 °C) Oral 87 20 100 %   02/02/24 2033 111/69 98.1 °F (36.7 °C) -- 87 18 100 %         REVIEW OF SYSTEMS:    Constitutional: negative fever, negative chills, negative weight loss  Eyes:   negative visual changes  ENT:   negative sore throat, tongue or lip swelling  Respiratory:  negative cough, negative dyspnea  Cards:  negative for chest pain, palpitations, lower extremity edema  GI:   negative for nausea, vomiting, diarrhea, and abdominal pain  Neuro:  negative for headaches, dizziness, vertigo  []                        Full ROS o/w normal/non contributor    Gen NAD  Res no distress  Psych normal    Available labs reviewed:  Labs:    Recent Results (from the past 24 hour(s))   Hemoglobin and Hematocrit    Collection Time: 02/02/24  8:49 AM   Result Value Ref Range    Hemoglobin 8.6 (L) 11.5 - 16.0 g/dL    Hematocrit 25.8 (L) 35.0 - 47.0 %   Basic Metabolic Panel    Collection Time: 02/02/24  8:49 AM   Result Value Ref Range    Sodium 139 136 - 145 mmol/L    Potassium 4.0 3.5 - 5.1 mmol/L    Chloride 113 (H) 97 - 108 mmol/L    CO2 23 21 - 32 mmol/L    Anion Gap 3 (L) 5 - 15 mmol/L    Glucose 89 65 - 100 mg/dL    BUN 11 6 - 20 MG/DL    Creatinine 0.75 0.55 - 1.02 MG/DL    Bun/Cre Ratio 15 12

## 2024-02-03 NOTE — DISCHARGE SUMMARY
Discharge Summary    Alyssa Panchal  :  1985  MRN:  [id number]    ADMIT DATE:  2024  DISCHARGE DATE:  2/3/2024    PRIMARY CARE PHYSICIAN:  Silvano Lopez MD    VISIT STATUS: Admission    CODE STATUS:  Full Code    DISCHARGE DIAGNOSES:  Principal Problem:    Anemia  Active Problems:    Hematoma  Resolved Problems:    * No resolved hospital problems. *      HOSPITAL COURSE:  Patient underwent cystoscopy with diagnostic ureteroscopy and stent placement. See op note for details. Monitored and did well on oral meds, labs and vitals stable at time of discharge  SIGNIFICANT DIAGNOSTIC STUDIES:  none  CONSULTANTS:  hematology  RECOMMENDED NEXT STEPS:   F/u in office Monday for stent removal     DISCHARGE MEDICATIONS:         Medication List        START taking these medications      folic acid 1 MG tablet  Commonly known as: FOLVITE  Take 1 tablet by mouth daily     HYDROmorphone 2 MG tablet  Commonly known as: Dilaudid  Take 1 tablet by mouth every 6 hours as needed for Pain for up to 3 days. Max Daily Amount: 8 mg     levothyroxine 100 MCG tablet  Commonly known as: SYNTHROID  Take 1 tablet by mouth daily     oxyBUTYnin 5 MG tablet  Commonly known as: DITROPAN  Take 1 tablet by mouth 3 times daily as needed (bladder spasms, stent colic)     tamsulosin 0.4 MG capsule  Commonly known as: FLOMAX  Take 1 capsule by mouth daily for 3 days            CONTINUE taking these medications      albuterol sulfate  (90 Base) MCG/ACT inhaler  Commonly known as: PROVENTIL;VENTOLIN;PROAIR     benzonatate 100 MG capsule  Commonly known as: TESSALON  Take 1 capsule by mouth 3 times daily as needed for Cough     fluticasone 50 MCG/ACT nasal spray  Commonly known as: FLONASE     omeprazole 40 MG delayed release capsule  Commonly known as: PRILOSEC     traMADol 50 MG tablet  Commonly known as: ULTRAM     VITAMIN D (ERGOCALCIFEROL) PO               Where to Get Your Medications        These medications were sent to

## 2024-02-03 NOTE — PROGRESS NOTES
Progress Note    Patient: Alyssa Panchal MRN: [id number]  SSN: [id number]    YOB: 1985  Age: 39 y.o.  Sex: female          ADMITTED:  2024 to James Barrera III, MD  for Bleeding [R58]  Anemia [D64.9]           Alyssa Panchal is 2 Days Post-Op Procedure(s):  Bilateral cystoscopy and ureteroscopy with basket retrieval of mass, laser ablation, and right retrogrades.  She is doing well.   Pain controlled with Dilaudid, ready to go home    AFVSS      Vitals:  Temp (24hrs), Av.2 °F (36.8 °C), Min:98.1 °F (36.7 °C), Max:98.2 °F (36.8 °C)     Blood pressure (!) 104/58, pulse 84, temperature 98.2 °F (36.8 °C), temperature source Oral, resp. rate 20, height 1.778 m (5' 10\"), weight 50.3 kg (110 lb 14.3 oz), SpO2 100 %.      I&O's:  No intake/output data recorded.   No intake/output data recorded.     Exam:   Physical Exam  General: NAD, pleasant  Respiratory: no distress, breathing easily, room air  Abdomen: soft, no distention; RLQ tender to palpation  : Right CVA tenderness, voiding independently, no UA to assess  Neuro: Appropriate, no focal neurological deficits  Skin: warm, dry  Extremities: moves all, full ROM     Labs:   Recent Labs     24  1632 24  0215 24  0849   WBC 9.2 11.1*  --    HGB 9.1* 9.0* 8.6*   HCT 28.1* 28.3* 25.8*    356  --        Recent Labs     24  0849      K 4.0   *   CO2 23   BUN 11        Cultures:        Imaging:       Assessment:     - 2 Days Post-Op Procedure(s):  Bilateral cystoscopy and ureteroscopy with basket retrieval of mass, laser ablation, and right retrogrades    Principal Problem:    Anemia  Active Problems:    Hematoma  Resolved Problems:    * No resolved hospital problems. *      Plan:     - d/c home today with meds for stent colic, f/u in office Monday for stent removal    Signed By: Ruperto Walker MD - February 3, 2024

## 2024-02-03 NOTE — PROGRESS NOTES
Discharge medications reviewed with the patient and appropriate educational materials and side effects teaching were provided. Discharge education provided, patient verbalized understanding of instructions. PIV removed prior to discharge.

## 2024-02-05 NOTE — OP NOTE
Operative Note      Patient: Alyssa Panchal  YOB: 1985  MRN: [id number]    Date of Procedure: 2/1/2024    Pre-Op Diagnosis Codes:     * Bleeding [R58]    Post-Op Diagnosis: Same       Procedure(s):  Bilateral cystoscopy and ureteroscopy with basket retrieval of mass, laser ablation, and right retrogrades    Surgeon(s):  James Barrera III, MD    Assistant:   * No surgical staff found *    Anesthesia: General    Estimated Blood Loss (mL): Minimal    Complications: None    Specimens:   * No specimens in log *    Implants:  Implant Name Type Inv. Item Serial No.  Lot No. LRB No. Used Action   STENT URET 7FR L28CM PERCFLX HYDR+ TAPR TIP GRAD - [id number]  STENT URET 7FR L28CM PERCFLX HYDR+ TAPR TIP GRAD  Dealer [employer]- [id number] Right 1 Implanted         Drains: * No LDAs found *    Findings: Clot in right renal pelvis and bladder some abnormal appearing blood vessels in calyxes but no active bleeding  Significant cystocele  Abnormal rotation of kidney        Detailed Description of Procedure:       Electronically signed by James Barrera III, MD on 2/5/2024 at 8:49 AM

## 2024-02-21 RX ORDER — LEVOTHYROXINE SODIUM 88 UG/1
88 TABLET ORAL DAILY
Qty: 30 TABLET | Refills: 11 | Status: SHIPPED | OUTPATIENT
Start: 2024-02-21

## 2024-02-26 NOTE — ANESTHESIA POSTPROCEDURE EVALUATION
Department of Anesthesiology  Postprocedure Note    Patient: Alyssa Panchal  MRN: [id number]  YOB: 1985  Date of evaluation: 2/1/2024    Procedure Summary       Date: 02/01/24 Room / Location: [facility] MAIN OR [address] [place] [address] MAIN OR    Anesthesia Start: 1504 Anesthesia Stop: 1636    Procedure: Bilateral cystoscopy and ureteroscopy with basket retrieval of mass, laser ablation, and right retrogrades (Bilateral: Bladder) Diagnosis:       Bleeding      (Bleeding [R58])    Providers: James Barrera III, MD Responsible Provider: Brooke Mendez DO    Anesthesia Type: General ASA Status: 3            Anesthesia Type: General    Kiara Phase I: Kiara Score: 9    Kiara Phase II:      Anesthesia Post Evaluation    Patient location during evaluation: PACU  Patient participation: complete - patient participated  Level of consciousness: awake and alert  Pain score: 3  Airway patency: patent  Nausea & Vomiting: no nausea and no vomiting  Cardiovascular status: hemodynamically stable  Respiratory status: acceptable  Hydration status: euvolemic  Pain management: adequate        No notable events documented.

## 2024-05-02 ENCOUNTER — NURSE ONLY (OUTPATIENT)
Facility: CLINIC | Age: 39
End: 2024-05-02

## 2024-05-02 DIAGNOSIS — E03.2 HYPOTHYROIDISM DUE TO MEDICATION: Primary | ICD-10-CM

## 2024-05-02 LAB
COMMENT:: NORMAL
SPECIMEN HOLD: NORMAL

## 2024-05-03 LAB
T4 FREE SERPL-MCNC: 0.9 NG/DL (ref 0.8–1.5)
TSH SERPL DL<=0.05 MIU/L-ACNC: 7.1 UIU/ML (ref 0.36–3.74)

## 2024-05-08 NOTE — TELEPHONE ENCOUNTER
Patient notified of elevated tsh and the need to increase her dose, she states she does take her meds every morning

## 2024-05-09 RX ORDER — LEVOTHYROXINE SODIUM 0.1 MG/1
100 TABLET ORAL DAILY
Qty: 30 TABLET | Refills: 11 | Status: SHIPPED | OUTPATIENT
Start: 2024-05-09

## (undated) DEVICE — LASER FIBER

## (undated) DEVICE — GLOVE ORANGE PI 7 1/2   MSG9075

## (undated) DEVICE — SHEET,DRAPE,UNDERBUTTOCK,GRAD POUCH,PORT: Brand: MEDLINE

## (undated) DEVICE — GUIDEWIRE ENDOSCP L150CM DIA0.035IN TIP 3CM PTFE NIT

## (undated) DEVICE — SYRINGE MED 10ML LUERLOCK TIP W/O SFTY DISP

## (undated) DEVICE — TUBING HYDR IRR --

## (undated) DEVICE — SOLUTION IRRIGATION STRL H2O 1000 ML UROMATIC CONTAINER

## (undated) DEVICE — CYSTO/BLADDER IRRIGATION SET, REGULATING CLAMP

## (undated) DEVICE — ELECTRODE PT RET AD L9FT HI MOIST COND ADH HYDRGEL CORDED

## (undated) DEVICE — NITINOL STONE RETRIEVAL BASKET: Brand: ZERO TIP

## (undated) DEVICE — OPEN-END URETERAL CATHETER: Brand: COOK

## (undated) DEVICE — CYSTO-SMH: Brand: MEDLINE INDUSTRIES, INC.